# Patient Record
Sex: FEMALE | Race: WHITE | Employment: UNEMPLOYED
[De-identification: names, ages, dates, MRNs, and addresses within clinical notes are randomized per-mention and may not be internally consistent; named-entity substitution may affect disease eponyms.]

---

## 2019-11-29 ENCOUNTER — NURSE TRIAGE (OUTPATIENT)
Dept: OTHER | Facility: CLINIC | Age: 24
End: 2019-11-29

## 2019-12-03 ENCOUNTER — NURSE ONLY (OUTPATIENT)
Dept: LAB | Age: 24
End: 2019-12-03

## 2020-06-19 ENCOUNTER — ANESTHESIA EVENT (OUTPATIENT)
Dept: LABOR AND DELIVERY | Age: 25
End: 2020-06-19
Payer: COMMERCIAL

## 2020-06-19 ENCOUNTER — HOSPITAL ENCOUNTER (INPATIENT)
Age: 25
LOS: 2 days | Discharge: HOME | End: 2020-06-21
Attending: OBSTETRICS & GYNECOLOGY | Admitting: OBSTETRICS & GYNECOLOGY
Payer: COMMERCIAL

## 2020-06-19 ENCOUNTER — ANESTHESIA (OUTPATIENT)
Dept: LABOR AND DELIVERY | Age: 25
End: 2020-06-19
Payer: COMMERCIAL

## 2020-06-19 LAB
ABO: NORMAL
AMPHETAMINE+METHAMPHETAMINE URINE SCREEN: NEGATIVE
ANTIBODY SCREEN: NORMAL
BARBITURATE QUANTITATIVE URINE: NEGATIVE
BASOPHILS # BLD: 0.4 %
BASOPHILS ABSOLUTE: 0.1 THOU/MM3 (ref 0–0.1)
BENZODIAZEPINE QUANTITATIVE URINE: NEGATIVE
CANNABINOID QUANTITATIVE URINE: NEGATIVE
COCAINE METABOLITE QUANTITATIVE URINE: NEGATIVE
EOSINOPHIL # BLD: 0.8 %
EOSINOPHILS ABSOLUTE: 0.1 THOU/MM3 (ref 0–0.4)
ERYTHROCYTE [DISTWIDTH] IN BLOOD BY AUTOMATED COUNT: 13.6 % (ref 11.5–14.5)
ERYTHROCYTE [DISTWIDTH] IN BLOOD BY AUTOMATED COUNT: 42.8 FL (ref 35–45)
HCT VFR BLD CALC: 36.9 % (ref 37–47)
HEMOGLOBIN: 11.9 GM/DL (ref 12–16)
IMMATURE GRANS (ABS): 0.14 THOU/MM3 (ref 0–0.07)
IMMATURE GRANULOCYTES: 0.9 %
LYMPHOCYTES # BLD: 11.5 %
LYMPHOCYTES ABSOLUTE: 1.7 THOU/MM3 (ref 1–4.8)
MCH RBC QN AUTO: 28.4 PG (ref 26–33)
MCHC RBC AUTO-ENTMCNC: 32.2 GM/DL (ref 32.2–35.5)
MCV RBC AUTO: 88.1 FL (ref 81–99)
MONOCYTES # BLD: 7 %
MONOCYTES ABSOLUTE: 1.1 THOU/MM3 (ref 0.4–1.3)
NUCLEATED RED BLOOD CELLS: 0 /100 WBC
OPIATES, URINE: NEGATIVE
OXYCODONE: NEGATIVE
PHENCYCLIDINE QUANTITATIVE URINE: NEGATIVE
PLATELET # BLD: 137 THOU/MM3 (ref 130–400)
PMV BLD AUTO: 12.2 FL (ref 9.4–12.4)
RBC # BLD: 4.19 MILL/MM3 (ref 4.2–5.4)
RH FACTOR: NORMAL
RPR: NONREACTIVE
SEG NEUTROPHILS: 79.4 %
SEGMENTED NEUTROPHILS ABSOLUTE COUNT: 12.1 THOU/MM3 (ref 1.8–7.7)
WBC # BLD: 15.2 THOU/MM3 (ref 4.8–10.8)

## 2020-06-19 PROCEDURE — 3700000025 EPIDURAL BLOCK: Performed by: ANESTHESIOLOGY

## 2020-06-19 PROCEDURE — 85025 COMPLETE CBC W/AUTO DIFF WBC: CPT

## 2020-06-19 PROCEDURE — 86901 BLOOD TYPING SEROLOGIC RH(D): CPT

## 2020-06-19 PROCEDURE — 10907ZC DRAINAGE OF AMNIOTIC FLUID, THERAPEUTIC FROM PRODUCTS OF CONCEPTION, VIA NATURAL OR ARTIFICIAL OPENING: ICD-10-PCS | Performed by: OBSTETRICS & GYNECOLOGY

## 2020-06-19 PROCEDURE — 36415 COLL VENOUS BLD VENIPUNCTURE: CPT

## 2020-06-19 PROCEDURE — 7200000001 HC VAGINAL DELIVERY

## 2020-06-19 PROCEDURE — 80307 DRUG TEST PRSMV CHEM ANLYZR: CPT

## 2020-06-19 PROCEDURE — 2500000003 HC RX 250 WO HCPCS: Performed by: ANESTHESIOLOGY

## 2020-06-19 PROCEDURE — 1220000001 HC SEMI PRIVATE L&D R&B

## 2020-06-19 PROCEDURE — 2709999900 HC NON-CHARGEABLE SUPPLY

## 2020-06-19 PROCEDURE — 86900 BLOOD TYPING SEROLOGIC ABO: CPT

## 2020-06-19 PROCEDURE — 6360000002 HC RX W HCPCS

## 2020-06-19 PROCEDURE — 0KQM0ZZ REPAIR PERINEUM MUSCLE, OPEN APPROACH: ICD-10-PCS | Performed by: OBSTETRICS & GYNECOLOGY

## 2020-06-19 PROCEDURE — 6370000000 HC RX 637 (ALT 250 FOR IP): Performed by: OBSTETRICS & GYNECOLOGY

## 2020-06-19 PROCEDURE — 86850 RBC ANTIBODY SCREEN: CPT

## 2020-06-19 PROCEDURE — 2580000003 HC RX 258: Performed by: OBSTETRICS & GYNECOLOGY

## 2020-06-19 PROCEDURE — 6360000002 HC RX W HCPCS: Performed by: OBSTETRICS & GYNECOLOGY

## 2020-06-19 PROCEDURE — 86592 SYPHILIS TEST NON-TREP QUAL: CPT

## 2020-06-19 RX ORDER — MORPHINE SULFATE 4 MG/ML
4 INJECTION, SOLUTION INTRAMUSCULAR; INTRAVENOUS
Status: DISCONTINUED | OUTPATIENT
Start: 2020-06-19 | End: 2020-06-20 | Stop reason: HOSPADM

## 2020-06-19 RX ORDER — EPHEDRINE SULFATE/0.9% NACL/PF 50 MG/5 ML
5 SYRINGE (ML) INTRAVENOUS PRN
Status: DISCONTINUED | OUTPATIENT
Start: 2020-06-19 | End: 2020-06-20

## 2020-06-19 RX ORDER — BUTORPHANOL TARTRATE 1 MG/ML
INJECTION, SOLUTION INTRAMUSCULAR; INTRAVENOUS
Status: COMPLETED
Start: 2020-06-19 | End: 2020-06-19

## 2020-06-19 RX ORDER — BUTORPHANOL TARTRATE 1 MG/ML
1 INJECTION, SOLUTION INTRAMUSCULAR; INTRAVENOUS
Status: DISCONTINUED | OUTPATIENT
Start: 2020-06-19 | End: 2020-06-20 | Stop reason: HOSPADM

## 2020-06-19 RX ORDER — METHYLERGONOVINE MALEATE 0.2 MG/ML
200 INJECTION INTRAVENOUS PRN
Status: DISCONTINUED | OUTPATIENT
Start: 2020-06-19 | End: 2020-06-20

## 2020-06-19 RX ORDER — MISOPROSTOL 200 UG/1
1000 TABLET ORAL PRN
Status: DISCONTINUED | OUTPATIENT
Start: 2020-06-19 | End: 2020-06-20 | Stop reason: HOSPADM

## 2020-06-19 RX ORDER — CARBOPROST TROMETHAMINE 250 UG/ML
250 INJECTION, SOLUTION INTRAMUSCULAR PRN
Status: DISCONTINUED | OUTPATIENT
Start: 2020-06-19 | End: 2020-06-20

## 2020-06-19 RX ORDER — SODIUM CHLORIDE 0.9 % (FLUSH) 0.9 %
10 SYRINGE (ML) INJECTION PRN
Status: DISCONTINUED | OUTPATIENT
Start: 2020-06-19 | End: 2020-06-20 | Stop reason: HOSPADM

## 2020-06-19 RX ORDER — MORPHINE SULFATE 2 MG/ML
2 INJECTION, SOLUTION INTRAMUSCULAR; INTRAVENOUS
Status: DISCONTINUED | OUTPATIENT
Start: 2020-06-19 | End: 2020-06-20 | Stop reason: HOSPADM

## 2020-06-19 RX ORDER — ONDANSETRON 2 MG/ML
8 INJECTION INTRAMUSCULAR; INTRAVENOUS EVERY 6 HOURS PRN
Status: DISCONTINUED | OUTPATIENT
Start: 2020-06-19 | End: 2020-06-20 | Stop reason: HOSPADM

## 2020-06-19 RX ORDER — DIPHENHYDRAMINE HYDROCHLORIDE 50 MG/ML
25 INJECTION INTRAMUSCULAR; INTRAVENOUS EVERY 4 HOURS PRN
Status: DISCONTINUED | OUTPATIENT
Start: 2020-06-19 | End: 2020-06-20

## 2020-06-19 RX ORDER — LIDOCAINE HYDROCHLORIDE 10 MG/ML
30 INJECTION, SOLUTION EPIDURAL; INFILTRATION; INTRACAUDAL; PERINEURAL PRN
Status: DISCONTINUED | OUTPATIENT
Start: 2020-06-19 | End: 2020-06-20 | Stop reason: HOSPADM

## 2020-06-19 RX ORDER — TERBUTALINE SULFATE 1 MG/ML
0.25 INJECTION, SOLUTION SUBCUTANEOUS ONCE
Status: DISCONTINUED | OUTPATIENT
Start: 2020-06-19 | End: 2020-06-20 | Stop reason: HOSPADM

## 2020-06-19 RX ORDER — SODIUM CHLORIDE, SODIUM LACTATE, POTASSIUM CHLORIDE, CALCIUM CHLORIDE 600; 310; 30; 20 MG/100ML; MG/100ML; MG/100ML; MG/100ML
INJECTION, SOLUTION INTRAVENOUS CONTINUOUS
Status: DISCONTINUED | OUTPATIENT
Start: 2020-06-19 | End: 2020-06-20

## 2020-06-19 RX ORDER — ACETAMINOPHEN 325 MG/1
650 TABLET ORAL EVERY 4 HOURS PRN
Status: DISCONTINUED | OUTPATIENT
Start: 2020-06-19 | End: 2020-06-20 | Stop reason: HOSPADM

## 2020-06-19 RX ADMIN — ACETAMINOPHEN 650 MG: 325 TABLET ORAL at 23:33

## 2020-06-19 RX ADMIN — BUTORPHANOL TARTRATE 1 MG: 1 INJECTION, SOLUTION INTRAMUSCULAR; INTRAVENOUS at 09:39

## 2020-06-19 RX ADMIN — SODIUM CHLORIDE, POTASSIUM CHLORIDE, SODIUM LACTATE AND CALCIUM CHLORIDE: 600; 310; 30; 20 INJECTION, SOLUTION INTRAVENOUS at 13:03

## 2020-06-19 RX ADMIN — SODIUM CHLORIDE, POTASSIUM CHLORIDE, SODIUM LACTATE AND CALCIUM CHLORIDE: 600; 310; 30; 20 INJECTION, SOLUTION INTRAVENOUS at 17:28

## 2020-06-19 RX ADMIN — DIPHENHYDRAMINE HYDROCHLORIDE 25 MG: 50 INJECTION INTRAMUSCULAR; INTRAVENOUS at 15:03

## 2020-06-19 RX ADMIN — DIPHENHYDRAMINE HYDROCHLORIDE 25 MG: 50 INJECTION INTRAMUSCULAR; INTRAVENOUS at 19:53

## 2020-06-19 RX ADMIN — Medication 1 MILLI-UNITS/MIN: at 20:23

## 2020-06-19 RX ADMIN — SODIUM CHLORIDE, POTASSIUM CHLORIDE, SODIUM LACTATE AND CALCIUM CHLORIDE: 600; 310; 30; 20 INJECTION, SOLUTION INTRAVENOUS at 09:25

## 2020-06-19 RX ADMIN — Medication 18 ML/HR: at 12:40

## 2020-06-19 ASSESSMENT — PAIN DESCRIPTION - DESCRIPTORS
DESCRIPTORS: CRAMPING

## 2020-06-19 ASSESSMENT — PAIN SCALES - GENERAL
PAINLEVEL_OUTOF10: 8
PAINLEVEL_OUTOF10: 2

## 2020-06-19 NOTE — PLAN OF CARE
Problem: Pain:  Goal: Pain level will decrease  Description: Pain level will decrease  Outcome: Ongoing  Note: Pain level decreased with dose of stadol from and 8 down to a 3 on the pamela scale and the pain goal is 6. Goal: Control of acute pain  Description: Control of acute pain  Outcome: Ongoing  Note: The pain of contraction cramping has been reduced from and 8 down to a 3 on the pamela scale with IV stadol and pain goal is 6. Goal: Control of chronic pain  Description: Control of chronic pain  Outcome: Ongoing  Note: Patient does not have chronic pain so this is not applicable. Problem: Anxiety:  Goal: Level of anxiety will decrease  Description: Level of anxiety will decrease  Outcome: Ongoing  Note: Discussed anxiety and encouraged to ask questions and plan of care discussed and verbalizes understanding of of plan of care. Verbalizes to ask questions to decrease anxiety. Problem: Breathing Pattern - Ineffective:  Goal: Able to breathe comfortably  Description: Able to breathe comfortably  Outcome: Ongoing  Note: Breathing normal and oxygen saturation is normal.     Problem: Fluid Volume - Imbalance:  Goal: Absence of imbalanced fluid volume signs and symptoms  Description: Absence of imbalanced fluid volume signs and symptoms  Outcome: Ongoing  Note: Afebrile and membranes are intact and will continue to monitor temperature and odor of amniotic fluid when it breaks. Goal: Absence of intrapartum hemorrhage signs and symptoms  Description: Absence of intrapartum hemorrhage signs and symptoms  Outcome: Ongoing  Note: No vaginal bleeding and will continue to monitor during labor. Problem: Infection - Intrapartum Infection:  Goal: Will show no infection signs and symptoms  Description: Will show no infection signs and symptoms  Outcome: Ongoing  Note: Afebrile and membranes are intact.      Problem: Labor Process - Prolonged:  Goal: Labor progression, first stage, within specified

## 2020-06-19 NOTE — FLOWSHEET NOTE
Fetal monitor applied to soft abdomen by ELMER Martinez RN and the feal heart tones are 132. Spoke with patient and informed her of results.  Educated on neutropenia precautions.  CBC will be drawn with PET scan on 6/22.    Information sent to patient regarding neutropenia via mail.

## 2020-06-19 NOTE — FLOWSHEET NOTE
Asked RINKU Modi RN if can just have a  and informed call call and ask OB doctor if wishes me to and states no I don't want that.

## 2020-06-19 NOTE — FLOWSHEET NOTE
2 para 0 with due date of 2020 arrived by wheelchair to labor and delivery. Ambulated to room 5c05 after weighed in the recovery room and says contractions started at 0900. Denies water being broke.

## 2020-06-19 NOTE — FLOWSHEET NOTE
Fetal monitor plugged back in and to bed and urine drug screen and CBC, type and screen and RPR sent to lab.

## 2020-06-19 NOTE — ANESTHESIA PROCEDURE NOTES
Epidural Block    Patient location during procedure: OB  Reason for block: labor epidural  Staffing  Anesthesiologist: Nabil Bryant DO  Performed: anesthesiologist   Preanesthetic Checklist  Completed: patient identified, site marked, surgical consent, pre-op evaluation, timeout performed, IV checked, risks and benefits discussed, monitors and equipment checked, anesthesia consent given, oxygen available and patient being monitored  Epidural  Patient position: sitting  Prep: ChloraPrep and site prepped and draped  Patient monitoring: continuous pulse ox and frequent blood pressure checks  Approach: midline  Location: lumbar (1-5)  Injection technique: NEENA saline  Provider prep: sterile gloves and mask  Needle  Needle type: Tuohy   Needle gauge: 18 G  Needle length: 3.5 in  Needle insertion depth: 5 cm  Catheter type: side hole  Catheter at skin depth: 11 cm  Test dose: negative  Assessment  Hemodynamics: stable  Attempts: 1

## 2020-06-19 NOTE — FLOWSHEET NOTE
Dr. Marquis Pires told that the patient is in labor with 2 cm cervix and paper thin and bulging bag of water with contractions 1-2 minutes apart and reactive tracing. Orders taken to initiate labor room orders.

## 2020-06-20 PROCEDURE — 6360000002 HC RX W HCPCS: Performed by: OBSTETRICS & GYNECOLOGY

## 2020-06-20 PROCEDURE — 90715 TDAP VACCINE 7 YRS/> IM: CPT | Performed by: OBSTETRICS & GYNECOLOGY

## 2020-06-20 PROCEDURE — 90472 IMMUNIZATION ADMIN EACH ADD: CPT | Performed by: OBSTETRICS & GYNECOLOGY

## 2020-06-20 PROCEDURE — 1200000000 HC SEMI PRIVATE

## 2020-06-20 PROCEDURE — 90471 IMMUNIZATION ADMIN: CPT

## 2020-06-20 PROCEDURE — 6370000000 HC RX 637 (ALT 250 FOR IP): Performed by: OBSTETRICS & GYNECOLOGY

## 2020-06-20 PROCEDURE — 90707 MMR VACCINE SC: CPT

## 2020-06-20 PROCEDURE — 6360000002 HC RX W HCPCS

## 2020-06-20 PROCEDURE — 2709999900 HC NON-CHARGEABLE SUPPLY

## 2020-06-20 RX ORDER — MORPHINE SULFATE 4 MG/ML
4 INJECTION, SOLUTION INTRAMUSCULAR; INTRAVENOUS
Status: DISCONTINUED | OUTPATIENT
Start: 2020-06-20 | End: 2020-06-21 | Stop reason: HOSPADM

## 2020-06-20 RX ORDER — HYDROCODONE BITARTRATE AND ACETAMINOPHEN 5; 325 MG/1; MG/1
1 TABLET ORAL EVERY 4 HOURS PRN
Status: DISCONTINUED | OUTPATIENT
Start: 2020-06-20 | End: 2020-06-21 | Stop reason: HOSPADM

## 2020-06-20 RX ORDER — SODIUM CHLORIDE 0.9 % (FLUSH) 0.9 %
10 SYRINGE (ML) INJECTION PRN
Status: DISCONTINUED | OUTPATIENT
Start: 2020-06-20 | End: 2020-06-21 | Stop reason: HOSPADM

## 2020-06-20 RX ORDER — DOCUSATE SODIUM 100 MG/1
100 CAPSULE, LIQUID FILLED ORAL 2 TIMES DAILY PRN
Status: DISCONTINUED | OUTPATIENT
Start: 2020-06-20 | End: 2020-06-21 | Stop reason: HOSPADM

## 2020-06-20 RX ORDER — ONDANSETRON 4 MG/1
8 TABLET, FILM COATED ORAL EVERY 8 HOURS PRN
Status: DISCONTINUED | OUTPATIENT
Start: 2020-06-20 | End: 2020-06-21 | Stop reason: HOSPADM

## 2020-06-20 RX ORDER — HYDROCODONE BITARTRATE AND ACETAMINOPHEN 5; 325 MG/1; MG/1
2 TABLET ORAL EVERY 4 HOURS PRN
Status: DISCONTINUED | OUTPATIENT
Start: 2020-06-20 | End: 2020-06-21 | Stop reason: HOSPADM

## 2020-06-20 RX ORDER — ONDANSETRON 2 MG/ML
4 INJECTION INTRAMUSCULAR; INTRAVENOUS EVERY 6 HOURS PRN
Status: DISCONTINUED | OUTPATIENT
Start: 2020-06-20 | End: 2020-06-21 | Stop reason: HOSPADM

## 2020-06-20 RX ORDER — MISOPROSTOL 200 UG/1
800 TABLET ORAL
Status: ACTIVE | OUTPATIENT
Start: 2020-06-20 | End: 2020-06-20

## 2020-06-20 RX ORDER — FERROUS SULFATE 325(65) MG
325 TABLET ORAL
Status: DISCONTINUED | OUTPATIENT
Start: 2020-06-20 | End: 2020-06-21 | Stop reason: HOSPADM

## 2020-06-20 RX ORDER — MODIFIED LANOLIN
OINTMENT (GRAM) TOPICAL PRN
Status: DISCONTINUED | OUTPATIENT
Start: 2020-06-20 | End: 2020-06-21 | Stop reason: HOSPADM

## 2020-06-20 RX ORDER — SODIUM CHLORIDE, SODIUM LACTATE, POTASSIUM CHLORIDE, CALCIUM CHLORIDE 600; 310; 30; 20 MG/100ML; MG/100ML; MG/100ML; MG/100ML
INJECTION, SOLUTION INTRAVENOUS CONTINUOUS
Status: DISCONTINUED | OUTPATIENT
Start: 2020-06-20 | End: 2020-06-21 | Stop reason: HOSPADM

## 2020-06-20 RX ORDER — CARBOPROST TROMETHAMINE 250 UG/ML
250 INJECTION, SOLUTION INTRAMUSCULAR
Status: DISPENSED | OUTPATIENT
Start: 2020-06-20 | End: 2020-06-20

## 2020-06-20 RX ORDER — METHYLERGONOVINE MALEATE 0.2 MG/ML
200 INJECTION INTRAVENOUS
Status: ACTIVE | OUTPATIENT
Start: 2020-06-20 | End: 2020-06-20

## 2020-06-20 RX ORDER — IBUPROFEN 800 MG/1
800 TABLET ORAL 3 TIMES DAILY
Status: DISCONTINUED | OUTPATIENT
Start: 2020-06-20 | End: 2020-06-21 | Stop reason: HOSPADM

## 2020-06-20 RX ORDER — SODIUM CHLORIDE 0.9 % (FLUSH) 0.9 %
10 SYRINGE (ML) INJECTION EVERY 12 HOURS SCHEDULED
Status: DISCONTINUED | OUTPATIENT
Start: 2020-06-20 | End: 2020-06-21 | Stop reason: HOSPADM

## 2020-06-20 RX ORDER — MORPHINE SULFATE 2 MG/ML
2 INJECTION, SOLUTION INTRAMUSCULAR; INTRAVENOUS
Status: DISCONTINUED | OUTPATIENT
Start: 2020-06-20 | End: 2020-06-21 | Stop reason: HOSPADM

## 2020-06-20 RX ORDER — ACETAMINOPHEN 325 MG/1
650 TABLET ORAL EVERY 4 HOURS PRN
Status: DISCONTINUED | OUTPATIENT
Start: 2020-06-20 | End: 2020-06-21 | Stop reason: HOSPADM

## 2020-06-20 RX ADMIN — TETANUS TOXOID, REDUCED DIPHTHERIA TOXOID AND ACELLULAR PERTUSSIS VACCINE, ADSORBED 0.5 ML: 5; 2.5; 8; 8; 2.5 SUSPENSION INTRAMUSCULAR at 22:50

## 2020-06-20 RX ADMIN — ACETAMINOPHEN 650 MG: 325 TABLET ORAL at 20:31

## 2020-06-20 RX ADMIN — IBUPROFEN 800 MG: 800 TABLET, FILM COATED ORAL at 06:45

## 2020-06-20 RX ADMIN — ACETAMINOPHEN 650 MG: 325 TABLET ORAL at 06:45

## 2020-06-20 RX ADMIN — IBUPROFEN 800 MG: 800 TABLET, FILM COATED ORAL at 17:14

## 2020-06-20 RX ADMIN — VORTIOXETINE 5 MG: 5 TABLET, FILM COATED ORAL at 22:48

## 2020-06-20 RX ADMIN — ACETAMINOPHEN 650 MG: 325 TABLET ORAL at 11:03

## 2020-06-20 RX ADMIN — MEASLES, MUMPS, AND RUBELLA VIRUS VACCINE LIVE 0.5 ML: 1000; 12500; 1000 INJECTION, POWDER, LYOPHILIZED, FOR SUSPENSION SUBCUTANEOUS at 22:49

## 2020-06-20 ASSESSMENT — PAIN SCALES - GENERAL
PAINLEVEL_OUTOF10: 3
PAINLEVEL_OUTOF10: 2
PAINLEVEL_OUTOF10: 5
PAINLEVEL_OUTOF10: 5
PAINLEVEL_OUTOF10: 4
PAINLEVEL_OUTOF10: 3
PAINLEVEL_OUTOF10: 0
PAINLEVEL_OUTOF10: 0

## 2020-06-20 NOTE — PROGRESS NOTES
Department of Obstetrics and Gynecology  Progress Note      S: doing well. No complaints. Lochia appropriate. Denies cp, sob, ct. Desires circumcision for male infant. O:   Vitals:    20 0831   BP: (!) 109/59   Pulse: 82   Resp: 16   Temp: 98.3 °F (36.8 °C)   SpO2:        Gen: no acute distress   Resp: breathing unlabored   Abd: soft, nondistended, fundus firm below umbilicus    A: 23 y.o.   PPD#1 s/p , doing well. P:   1. O POS   2. Con't postpartum care-to call [facility] today to change antidepressant to something that is compatible with breast feeding.   3. Anticipate d/c home tomorrow    Patricia Livingston  8:34 AM  2020

## 2020-06-20 NOTE — FLOWSHEET NOTE
Upon given bedside shift report to receiving staff and discussing pt's fall pt states \"that's happened before to me\"  Upon further discussion pt states that her right leg will just \"give out\" sometimes, but it is random. Educated pt on that pt is considered a high fall risk because of her history of her right knee buckling. Also educated pt that she should not be holding her baby while up and walking in her room because if her knee would give out and she was holding her baby then her baby would also fall and could be hurt and we want what is best and safest for both her and the baby.

## 2020-06-20 NOTE — PROGRESS NOTES
Called for delivery. On arrival at bedside, pt complete and +4. Pushing adequately with contractions. FHTs: 130, mod maico, +accels, variable. Shelton: q2-3 min. Category II tracing. Overall reassuring. Anticipate .      Nkechi Alexander  10:12 PM  2020

## 2020-06-20 NOTE — PLAN OF CARE
Problem: Falls - Risk of:  Goal: Will remain free from falls  Description: Will remain free from falls  Outcome: Ongoing  Note: Pt free from falls at this time     Problem: Falls - Risk of:  Goal: Absence of physical injury  Description: Absence of physical injury  Outcome: Ongoing  Note: Pt remains free from physical injury at this time     Problem: Discharge Planning:  Goal: Discharged to appropriate level of care  Description: Discharged to appropriate level of care  Outcome: Ongoing  Note: Remains in hospital, discussed possible discharge needs. Problem: Constipation:  Goal: Bowel elimination is within specified parameters  Description: Bowel elimination is within specified parameters  Outcome: Ongoing  Note: Pt has active bowel sounds     Problem: Fluid Volume - Imbalance:  Goal: Absence of postpartum hemorrhage signs and symptoms  Description: Absence of postpartum hemorrhage signs and symptoms  Outcome: Ongoing  Note: No signs of postpartum hemorrhage at this time     Problem: Infection - Risk of, Puerperal Infection:  Goal: Will show no infection signs and symptoms  Description: Will show no infection signs and symptoms  Outcome: Ongoing  Note: No signs of infection at this time     Problem: Mood - Altered:  Goal: Mood stable  Description: Mood stable  Outcome: Ongoing  Note: Pt appropriate with infant family and this RN     Problem: Pain - Acute:  Goal: Pain level will decrease  Description: Pain level will decrease  Outcome: Ongoing  Note: Pain controlled with po meds. Discussed ice for perineal pain and/or incisional pain or the use of warm blanket/heating pad for uterine cramps. Pt states her pain goal 3/10 has been met. Care plan reviewed with patient and she contributes to goal setting and voices understanding of plan of care.

## 2020-06-20 NOTE — FLOWSHEET NOTE
Post-Fall Assessment  Date of Fall:   6/19/2020  Time of Fall:   2331   Yes No N/A Comment   Was Patient on Falling Star Program? [] [x] []    Was the Fall Witnessed? [x] [] []    Were Clothes a Factor? [] [x] []    Was Patient Wearing Corrective Footwear? [] [x] []    Other Environmental Factors Involved? [] [x] []      Description of Fall  Who found the patient: Pt fell as she was ambulating to the bathroom 2 hours post delivery  Where was the patient at the time of the fall:  Ambulating to bathroom with a standby assist  Brief description of fall: RN stood pt at side of bed and had her march in place to make sure she had her feet underneath of her. Pt took 2 steps to the bathroom and then her right leg buckled and pt went down to her rt knee and then continued to lower herself to the floor at that point. Pt then with assistance of RN that she got back to standing and was assisted back to the bed  Patient comments regarding fall:   \"Wow, that was weird\"    Medications potentially contributing to fall risk (such as Sedatives, Hypnotics, Antihypertensives, Narcotics, Psychotropics, Anticonvulsants):   Pt is s/p epidural by 2 hours. Patient Assessment of Injury    (Please document Vital Signs in Doc Flowsheet)     Yes No   Patient hit his/her head [] [x]   Patient is taking an anticoagulant [] [x]   CT of Head requested [] [x]     Neurological Assessment Protocol: If the patient has hit his/her head during this fall,   a Neurological Assessment must be completed every 2 hours for 12 hours;   every 3 hours for 24 hours;   then every 4 hours for 24 hours. Document in Doc Flowsheets. Neurological Assessment Protocol initiated  No    If the patient did not hit his/her head during this fall, monitor vital signs every 8 hours. Notify the physician within 24 hours and document.       Physician Notification  Please document under Provider Notification group within the Assessment (Complex Assessment) template of

## 2020-06-21 VITALS
HEART RATE: 99 BPM | OXYGEN SATURATION: 96 % | BODY MASS INDEX: 28.71 KG/M2 | SYSTOLIC BLOOD PRESSURE: 100 MMHG | TEMPERATURE: 97.8 F | HEIGHT: 62 IN | RESPIRATION RATE: 16 BRPM | WEIGHT: 156 LBS | DIASTOLIC BLOOD PRESSURE: 68 MMHG

## 2020-06-21 PROCEDURE — 6370000000 HC RX 637 (ALT 250 FOR IP): Performed by: OBSTETRICS & GYNECOLOGY

## 2020-06-21 PROCEDURE — 2709999900 HC NON-CHARGEABLE SUPPLY

## 2020-06-21 RX ORDER — IBUPROFEN 600 MG/1
600 TABLET ORAL EVERY 6 HOURS PRN
Qty: 30 TABLET | Refills: 1 | COMMUNITY
Start: 2020-06-21

## 2020-06-21 RX ORDER — ACETAMINOPHEN 325 MG/1
650 TABLET ORAL EVERY 6 HOURS PRN
Qty: 120 TABLET | Refills: 3 | COMMUNITY
Start: 2020-06-21

## 2020-06-21 RX ADMIN — IBUPROFEN 800 MG: 800 TABLET, FILM COATED ORAL at 01:17

## 2020-06-21 RX ADMIN — DOCUSATE SODIUM 100 MG: 100 CAPSULE, LIQUID FILLED ORAL at 10:28

## 2020-06-21 RX ADMIN — IBUPROFEN 800 MG: 800 TABLET, FILM COATED ORAL at 10:28

## 2020-06-21 ASSESSMENT — PAIN SCALES - GENERAL
PAINLEVEL_OUTOF10: 3
PAINLEVEL_OUTOF10: 2

## 2020-06-21 NOTE — FLOWSHEET NOTE
Pt assisted with moderate assist to wheelchair with stand and pivot approach. Right leg continues to be weak s/p epidural, pt notes sensation and denies any kind of pain.

## 2020-06-21 NOTE — DISCHARGE SUMMARY
Obstetric Discharge Summary      Pt Name: Gil Meckel  MRN: [id number] Jermaine #: [de-identified]  YOB: 1995        Admitting Diagnosis  23 y.o.  @ 38+2 wks IUP presented with labor. Reasons for Admission on 2020  9:06 AM  Vaginal Delivery    Hospital course: 23 y.o.  @ 38+2 wks IUP presented with labor. She progressed well and delivered via . Her post partum course was uncomplicated. She was discharged home on PPD#2 in good condition. Postpartum/Operative Complications  none    Discharge Diagnosis  male infant      Discharge Information  Current Discharge Medication List      START taking these medications    Details   ibuprofen (ADVIL;MOTRIN) 600 MG tablet Take 1 tablet by mouth every 6 hours as needed for Pain  Qty: 30 tablet, Refills: 1      acetaminophen (AMINOFEN) 325 MG tablet Take 2 tablets by mouth every 6 hours as needed for Pain  Qty: 120 tablet, Refills: 3         CONTINUE these medications which have NOT CHANGED    Details   Prenatal MV & Min w/FA-DHA ([facility] PRENATAL GUMMY PO) Take 2 tablets by mouth daily      VORTIoxetine (TRINTELLIX) 5 MG tablet Take 10 mg by mouth daily               Diet: regular  Activity: nothing in the vagina for 6 weeks-no sex, no tampons, no douching, no heavy lifting, limited activity for 2-3 weeks  Discharge to:  home  Follow up in 6 wks.     Rachelle Thompson  7:19 AM  2020

## 2020-06-21 NOTE — PLAN OF CARE
Care plan reviewed with patient   Patient   Problem: Falls - Risk of:  Goal: Will remain free from falls  Description: Will remain free from falls  6/21/2020 0905 by Pedro Luis Mane RN  Outcome: Ongoing     Problem: Falls - Risk of:  Goal: Absence of physical injury  Description: Absence of physical injury  6/21/2020 0905 by Pedro Luis Mane RN  Outcome: Ongoing     Problem: Discharge Planning:  Goal: Discharged to appropriate level of care  Description: Discharged to appropriate level of care  6/21/2020 0905 by Pedro Luis Mane RN  Outcome: Ongoing  Note: Pt voices understanding of discharge instructions, including when to call Dr. Shyann Jauregui: Constipation:  Goal: Bowel elimination is within specified parameters  Description: Bowel elimination is within specified parameters  6/21/2020 0905 by Pedro Luis Mane RN  Outcome: Ongoing  Note: Pt taking stool softeners as prescribed,  Drinking plenty of fluids,  Increasing fiber. Problem: Constipation:  Goal: Bowel elimination is within specified parameters  Description: Bowel elimination is within specified parameters  6/21/2020 0905 by Pedro Luis Mane RN  Outcome: Ongoing  Note: Pt taking stool softeners as prescribed,  Drinking plenty of fluids,  Increasing fiber. Problem: Fluid Volume - Imbalance:  Goal: Absence of postpartum hemorrhage signs and symptoms  Description: Absence of postpartum hemorrhage signs and symptoms  6/21/2020 0905 by Pedro Luis Mane RN  Outcome: Ongoing  Note: Pt. Lochia within normal limits. Problem: Fluid Volume - Imbalance:  Goal: Absence of postpartum hemorrhage signs and symptoms  Description: Absence of postpartum hemorrhage signs and symptoms  6/21/2020 0905 by Pedro Luis Mane RN  Outcome: Ongoing  Note: Pt. Lochia within normal limits.       Problem: Infection - Risk of, Puerperal Infection:  Goal: Will show no infection signs and symptoms  Description: Will show no infection signs and symptoms  6/21/2020 0905 by Pedro Luis Mane RN  Outcome: Ongoing  Note: Pt is afebrile,  Vitals within normal limits,  Shows no signs or symptoms of infection      Problem: Mood - Altered:  Goal: Mood stable  Description: Mood stable  6/21/2020 0905 by Sujey Reyes RN  Outcome: Ongoing  Note: Pt calm and cooperative      Problem: Pain - Acute:  Goal: Pain level will decrease  Description: Pain level will decrease  6/21/2020 0905 by Sujey Reyes RN  Outcome: Ongoing  Note: Pt states she is comfortable  Taking pain medications as ordered     verbalize understanding of the plan of care and contribute to goal setting.

## 2021-05-12 ENCOUNTER — NURSE TRIAGE (OUTPATIENT)
Dept: OTHER | Facility: CLINIC | Age: 26
End: 2021-05-12

## 2021-05-12 NOTE — TELEPHONE ENCOUNTER
Reason for Disposition   Syphilis, questions about   Symptoms of a Sexually Transmitted Disease or Infection (STD, STI)   Vaginal discharge   Patient is worried about sexually transmitted disease (STD)    Answer Assessment - Initial Assessment Questions  1. SYMPTOMS: \"Do you have any symptoms of an STD? \" If so, ask: \"What are they? \"      Blister type sores that come and go. She feels like it was contracted over 5 years ago. She states negative for Herpes and Syphilis in 2019.    2. TYPE: \"What STD do you have questions about? \"      Syphilis    3. EXPOSURE: \"Were you exposed to an STD? \" If so, ask: \"When and what kind of exposure? \"      Syphlis    4. PREVENTION: \"Do you have questions about preventing AIDS and other STDs? \"      No, just if she has it. 5. PREGNANCY: \"Is there any chance you are pregnant? \" \"When was your last menstrual period? \"      \"Possibly but I have an IUD. \"    Protocols used: STD QUESTIONS-ADULT-, [facility], VAGINAL DISCHARGE-ADULT-    Gynecologist - Dr. Maryann Chong. Bruising on legs - random. States she is not abused. They just show up. Blisters every now and then, vaginal area. Sometimes \"has what she is not used to. .. white discharge that  becomes yellowish. \"    Getting mixed messages. .. delayed answers. States she is now having low grade fevers. 99's. Recommendation is to be contact her OBGYN in the morning to discuss risks of STD and testing if necessary. This triage is a result of a call to Alycia ardon Nurse. Please do not respond to the triage nurse through this encounter. Any subsequent communication should be directly with the patient.

## 2023-02-06 ENCOUNTER — NURSE ONLY (OUTPATIENT)
Dept: LAB | Age: 28
End: 2023-02-06

## 2023-02-08 LAB
BACTERIA UR CULT: ABNORMAL
BACTERIA UR CULT: ABNORMAL
ORGANISM: ABNORMAL

## 2023-02-21 LAB — CYTOLOGY THIN PREP PAP: NORMAL

## 2023-03-21 ENCOUNTER — NURSE TRIAGE (OUTPATIENT)
Dept: OTHER | Facility: CLINIC | Age: 28
End: 2023-03-21
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
## 2023-04-06 ENCOUNTER — HOSPITAL ENCOUNTER (EMERGENCY)
Age: 28
Discharge: HOME | End: 2023-04-06
Payer: COMMERCIAL

## 2023-04-06 ENCOUNTER — APPOINTMENT (OUTPATIENT)
Dept: GENERAL RADIOLOGY | Age: 28
End: 2023-04-06
Payer: COMMERCIAL

## 2023-04-06 VITALS
SYSTOLIC BLOOD PRESSURE: 105 MMHG | WEIGHT: 150 LBS | TEMPERATURE: 98.7 F | RESPIRATION RATE: 18 BRPM | HEIGHT: 62 IN | BODY MASS INDEX: 27.6 KG/M2 | DIASTOLIC BLOOD PRESSURE: 71 MMHG | OXYGEN SATURATION: 98 % | HEART RATE: 81 BPM

## 2023-04-06 DIAGNOSIS — M25.511 ACUTE PAIN OF RIGHT SHOULDER: Primary | ICD-10-CM

## 2023-04-06 PROCEDURE — 96372 THER/PROPH/DIAG INJ SC/IM: CPT

## 2023-04-06 PROCEDURE — 6360000002 HC RX W HCPCS: Performed by: NURSE PRACTITIONER

## 2023-04-06 PROCEDURE — 96374 THER/PROPH/DIAG INJ IV PUSH: CPT

## 2023-04-06 PROCEDURE — 73030 X-RAY EXAM OF SHOULDER: CPT

## 2023-04-06 PROCEDURE — 99284 EMERGENCY DEPT VISIT MOD MDM: CPT

## 2023-04-06 RX ORDER — KETOROLAC TROMETHAMINE 30 MG/ML
30 INJECTION, SOLUTION INTRAMUSCULAR; INTRAVENOUS ONCE
Status: COMPLETED | OUTPATIENT
Start: 2023-04-06 | End: 2023-04-06

## 2023-04-06 RX ORDER — METHYLPREDNISOLONE SODIUM SUCCINATE 125 MG/2ML
60 INJECTION, POWDER, LYOPHILIZED, FOR SOLUTION INTRAMUSCULAR; INTRAVENOUS ONCE
Status: COMPLETED | OUTPATIENT
Start: 2023-04-06 | End: 2023-04-06

## 2023-04-06 RX ORDER — METHYLPREDNISOLONE 4 MG/1
TABLET ORAL
Qty: 1 KIT | Refills: 0 | Status: SHIPPED | OUTPATIENT
Start: 2023-04-06 | End: 2023-04-12

## 2023-04-06 RX ORDER — ORPHENADRINE CITRATE 30 MG/ML
60 INJECTION INTRAMUSCULAR; INTRAVENOUS ONCE
Status: COMPLETED | OUTPATIENT
Start: 2023-04-06 | End: 2023-04-06

## 2023-04-06 RX ORDER — IBUPROFEN 600 MG/1
600 TABLET ORAL 3 TIMES DAILY PRN
Qty: 30 TABLET | Refills: 0 | Status: SHIPPED | OUTPATIENT
Start: 2023-04-06

## 2023-04-06 RX ADMIN — ORPHENADRINE CITRATE 60 MG: 30 INJECTION INTRAMUSCULAR; INTRAVENOUS at 14:36

## 2023-04-06 RX ADMIN — METHYLPREDNISOLONE SODIUM SUCCINATE 60 MG: 125 INJECTION, POWDER, FOR SOLUTION INTRAMUSCULAR; INTRAVENOUS at 14:34

## 2023-04-06 RX ADMIN — KETOROLAC TROMETHAMINE 30 MG: 30 INJECTION, SOLUTION INTRAMUSCULAR at 14:35

## 2023-04-06 ASSESSMENT — PAIN - FUNCTIONAL ASSESSMENT
PAIN_FUNCTIONAL_ASSESSMENT: 0-10
PAIN_FUNCTIONAL_ASSESSMENT: 0-10

## 2023-04-06 ASSESSMENT — PAIN DESCRIPTION - ORIENTATION: ORIENTATION: LEFT;RIGHT

## 2023-04-06 ASSESSMENT — PAIN SCALES - GENERAL
PAINLEVEL_OUTOF10: 6
PAINLEVEL_OUTOF10: 6

## 2023-04-06 ASSESSMENT — PAIN DESCRIPTION - PAIN TYPE: TYPE: ACUTE PAIN

## 2023-04-06 ASSESSMENT — PAIN DESCRIPTION - LOCATION: LOCATION: SHOULDER

## 2023-04-06 NOTE — Clinical Note
Matthew Avila was seen and treated in our emergency department on 4/6/2023. She may return to work on 04/07/2023. If you have any questions or concerns, please don't hesitate to call.       Ariel Wise, KENDALL - CNP
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
## 2023-04-06 NOTE — Clinical Note
Sunshine Hammer was seen and treated in our emergency department on 4/6/2023. She may return to work on 04/07/2023. If you have any questions or concerns, please don't hesitate to call.       Carlos Haywood, APRN - CNP
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
## 2023-04-06 NOTE — DISCHARGE INSTRUCTIONS
Rest, stay well-hydrated. Over-the-counter Tylenol and/or Motrin as needed for pain. Medrol Dosepak as prescribed. Robaxin as needed for muscle pain, observe for sedative precautions, no drinking, driving or operating heavy machinery. Follow-up with [facility] within the next week for reevaluation. If any worsening or concerns return to the ER immediately.

## 2023-06-22 ENCOUNTER — NURSE ONLY (OUTPATIENT)
Dept: LAB | Age: 28
End: 2023-06-22

## 2024-02-08 ENCOUNTER — NURSE ONLY (OUTPATIENT)
Dept: LAB | Age: 29
End: 2024-02-08

## 2024-02-09 LAB — SOURCE: NORMAL

## 2024-02-11 LAB
SOURCE: NORMAL
TRICHOMONAS VAGINALI, MOLECULAR: NEGATIVE

## 2024-02-12 LAB
C. TRACHOMATIS DNA,THIN PREP: NEGATIVE
N. GONORRHOEAE DNA, THIN PREP: NEGATIVE
SOURCE: NORMAL

## 2024-02-18 LAB — CYTOLOGY THIN PREP PAP: NORMAL

## 2024-04-27 ENCOUNTER — HOSPITAL ENCOUNTER (EMERGENCY)
Age: 29
Discharge: HOME | End: 2024-04-28
Attending: EMERGENCY MEDICINE
Payer: MEDICAID

## 2024-04-27 ENCOUNTER — APPOINTMENT (OUTPATIENT)
Dept: GENERAL RADIOLOGY | Age: 29
End: 2024-04-27
Payer: MEDICAID

## 2024-04-27 VITALS
TEMPERATURE: 97.4 F | HEART RATE: 69 BPM | HEIGHT: 62 IN | OXYGEN SATURATION: 98 % | DIASTOLIC BLOOD PRESSURE: 71 MMHG | WEIGHT: 160 LBS | RESPIRATION RATE: 21 BRPM | SYSTOLIC BLOOD PRESSURE: 105 MMHG | BODY MASS INDEX: 29.44 KG/M2

## 2024-04-27 DIAGNOSIS — R07.9 CHEST PAIN, UNSPECIFIED TYPE: Primary | ICD-10-CM

## 2024-04-27 LAB
ANION GAP SERPL CALC-SCNC: 14 MEQ/L (ref 8–16)
BASOPHILS ABSOLUTE: 0.1 THOU/MM3 (ref 0–0.1)
BASOPHILS NFR BLD AUTO: 0.7 %
BUN SERPL-MCNC: 6 MG/DL (ref 7–22)
CALCIUM SERPL-MCNC: 9.2 MG/DL (ref 8.5–10.5)
CHLORIDE SERPL-SCNC: 102 MEQ/L (ref 98–111)
CO2 SERPL-SCNC: 21 MEQ/L (ref 23–33)
CREAT SERPL-MCNC: 0.8 MG/DL (ref 0.4–1.2)
D DIMER PPP IA.FEU-MCNC: 239 NG/ML FEU (ref 0–500)
DEPRECATED RDW RBC AUTO: 37.6 FL (ref 35–45)
EOSINOPHIL NFR BLD AUTO: 1.2 %
EOSINOPHILS ABSOLUTE: 0.1 THOU/MM3 (ref 0–0.4)
ERYTHROCYTE [DISTWIDTH] IN BLOOD BY AUTOMATED COUNT: 12.3 % (ref 11.5–14.5)
GFR SERPL CREATININE-BSD FRML MDRD: > 90 ML/MIN/1.73M2
GLUCOSE SERPL-MCNC: 97 MG/DL (ref 70–108)
HCG UR QL: NEGATIVE
HCT VFR BLD AUTO: 46.9 % (ref 37–47)
HGB BLD-MCNC: 15.9 GM/DL (ref 12–16)
IMM GRANULOCYTES # BLD AUTO: 0.04 THOU/MM3 (ref 0–0.07)
IMM GRANULOCYTES NFR BLD AUTO: 0.4 %
LYMPHOCYTES ABSOLUTE: 1.9 THOU/MM3 (ref 1–4.8)
LYMPHOCYTES NFR BLD AUTO: 17.1 %
MCH RBC QN AUTO: 29.2 PG (ref 26–33)
MCHC RBC AUTO-ENTMCNC: 33.9 GM/DL (ref 32.2–35.5)
MCV RBC AUTO: 86.2 FL (ref 81–99)
MONOCYTES ABSOLUTE: 0.5 THOU/MM3 (ref 0.4–1.3)
MONOCYTES NFR BLD AUTO: 4.5 %
NEUTROPHILS NFR BLD AUTO: 76.1 %
NRBC BLD AUTO-RTO: 0 /100 WBC
OSMOLALITY SERPL CALC.SUM OF ELEC: 271.4 MOSMOL/KG (ref 275–300)
PLATELET # BLD AUTO: 196 THOU/MM3 (ref 130–400)
PMV BLD AUTO: 11.7 FL (ref 9.4–12.4)
POTASSIUM SERPL-SCNC: 3.9 MEQ/L (ref 3.5–5.2)
RBC # BLD AUTO: 5.44 MILL/MM3 (ref 4.2–5.4)
SEGMENTED NEUTROPHILS ABSOLUTE COUNT: 8.4 THOU/MM3 (ref 1.8–7.7)
SODIUM SERPL-SCNC: 137 MEQ/L (ref 135–145)
WBC # BLD AUTO: 11 THOU/MM3 (ref 4.8–10.8)

## 2024-04-27 PROCEDURE — 71046 X-RAY EXAM CHEST 2 VIEWS: CPT

## 2024-04-27 PROCEDURE — 99285 EMERGENCY DEPT VISIT HI MDM: CPT

## 2024-04-27 PROCEDURE — 93005 ELECTROCARDIOGRAM TRACING: CPT

## 2024-04-27 PROCEDURE — 85379 FIBRIN DEGRADATION QUANT: CPT

## 2024-04-27 PROCEDURE — 80048 BASIC METABOLIC PNL TOTAL CA: CPT

## 2024-04-27 PROCEDURE — 85025 COMPLETE CBC W/AUTO DIFF WBC: CPT

## 2024-04-27 PROCEDURE — 81025 URINE PREGNANCY TEST: CPT

## 2024-04-27 PROCEDURE — 36415 COLL VENOUS BLD VENIPUNCTURE: CPT

## 2024-04-27 ASSESSMENT — PAIN SCALES - GENERAL: PAINLEVEL_OUTOF10: 7

## 2024-04-27 ASSESSMENT — PAIN - FUNCTIONAL ASSESSMENT: PAIN_FUNCTIONAL_ASSESSMENT: 0-10

## 2024-04-28 LAB
EKG ATRIAL RATE: 94 BPM
EKG P AXIS: 72 DEGREES
EKG P-R INTERVAL: 152 MS
EKG Q-T INTERVAL: 348 MS
EKG QRS DURATION: 102 MS
EKG QTC CALCULATION (BAZETT): 435 MS
EKG R AXIS: 48 DEGREES
EKG T AXIS: 46 DEGREES
EKG VENTRICULAR RATE: 94 BPM

## 2024-04-28 PROCEDURE — 93010 ELECTROCARDIOGRAM REPORT: CPT | Performed by: NUCLEAR MEDICINE

## 2024-04-28 NOTE — ED PROVIDER NOTES
demonstrated.      This document has been electronically signed by: Maury Bustamante MD on    04/27/2024 11:44 PM          LABS: (none if blank)  Labs Reviewed   CBC WITH AUTO DIFFERENTIAL - Abnormal; Notable for the following components:       Result Value    WBC 11.0 (*)     RBC 5.44 (*)     Segs Absolute 8.4 (*)     All other components within normal limits   BASIC METABOLIC PANEL - Abnormal; Notable for the following components:    CO2 21 (*)     BUN 6 (*)     All other components within normal limits   OSMOLALITY - Abnormal; Notable for the following components:    Osmolality Calc 271.4 (*)     All other components within normal limits   D-DIMER, QUANTITATIVE   PREGNANCY, URINE   ANION GAP   GLOMERULAR FILTRATION RATE, ESTIMATED       (Any cultures that may have been sent were not resulted at the time of this patient visit)    MEDICAL DECISION MAKING / ED COURSE:         Number and Complexity of Problems            Problem List This Visit:         Chief Complaint   Patient presents with    Chest Pain            Differential Diagnosis includes (but not limited to):                 ACS, pneumonia, COVID, flu, other viral URI, myocarditis, pericarditis, pneumothorax, aortic dissection, thoracic aortic aneurysm, GERD, cholecystitis, cholangitis, pancreatitis, peptic ulcer disease, skeletal muscular, costochondritis, pulmonary embolism          While some of the above are unlikely, they were still considered in my differential and medical decision making.                     Data Reviewed (none if left blank)         My Independent interpretations:   See ED Course for labs and image interpretations.   EKG interpreted by me:      Normal sinus rhythm without any ST elevations depressions or T wave inversions.  Ventricular rate 94 QTc 435                 External Documentation Reviewed:         Previous patient encounter documents & history available on EMR was reviewed            See Formal Diagnostic Results

## 2024-04-28 NOTE — ED TRIAGE NOTES
Pt to ED with c/o chest pain. Chest pain started three days ago with a pain of 3/10. Pt states the chest pain is currently 7/10. Pt states she has been having headaches, double vision, and dizziness. EKG completed. Telemetry in place. Pt's friend currently at bedside. Pt questions answered. Pt has even and unlabored respirations.

## 2024-04-28 NOTE — DISCHARGE INSTRUCTIONS
No significant abnormalities identified on your labs or imaging today.  Unclear what the cause of your pain is today and is important to follow-up with outpatient testing.  No primary care doctor was identified on your chart today so we did attach to [facility].  Please call them to establish as a patient for your outpatient testing needs.  Return to the emergency room any new or worsening symptoms.

## 2024-04-28 NOTE — ED NOTES
Pt sitting in bed with friend at bedside. Pt has even and unlabored respirations. Pt questions answered. Telemetry in place.

## 2024-10-04 ENCOUNTER — TELEPHONE (OUTPATIENT)
Dept: FAMILY MEDICINE CLINIC | Age: 29
End: 2024-10-04

## 2024-10-08 SDOH — HEALTH STABILITY: PHYSICAL HEALTH: ON AVERAGE, HOW MANY DAYS PER WEEK DO YOU ENGAGE IN MODERATE TO STRENUOUS EXERCISE (LIKE A BRISK WALK)?: 5 DAYS

## 2024-10-08 SDOH — HEALTH STABILITY: PHYSICAL HEALTH: ON AVERAGE, HOW MANY MINUTES DO YOU ENGAGE IN EXERCISE AT THIS LEVEL?: 50 MIN

## 2024-10-10 ENCOUNTER — OFFICE VISIT (OUTPATIENT)
Dept: FAMILY MEDICINE CLINIC | Age: 29
End: 2024-10-10

## 2024-10-10 ENCOUNTER — LAB (OUTPATIENT)
Dept: LAB | Age: 29
End: 2024-10-10

## 2024-10-10 VITALS
SYSTOLIC BLOOD PRESSURE: 106 MMHG | TEMPERATURE: 97.7 F | BODY MASS INDEX: 26.58 KG/M2 | WEIGHT: 150 LBS | OXYGEN SATURATION: 98 % | HEIGHT: 63 IN | HEART RATE: 96 BPM | DIASTOLIC BLOOD PRESSURE: 62 MMHG | RESPIRATION RATE: 16 BRPM

## 2024-10-10 DIAGNOSIS — F17.200 SMOKER: ICD-10-CM

## 2024-10-10 DIAGNOSIS — M26.629 CHRONIC TMJ PAIN: ICD-10-CM

## 2024-10-10 DIAGNOSIS — Z00.00 WELL ADULT EXAM: Primary | ICD-10-CM

## 2024-10-10 DIAGNOSIS — Q79.60 EHLERS-DANLOS SYNDROME: ICD-10-CM

## 2024-10-10 DIAGNOSIS — Z23 FLU VACCINE NEED: ICD-10-CM

## 2024-10-10 DIAGNOSIS — G89.29 CHRONIC TMJ PAIN: ICD-10-CM

## 2024-10-10 DIAGNOSIS — B37.0 ORAL THRUSH: ICD-10-CM

## 2024-10-10 DIAGNOSIS — G47.9 DISORDERED SLEEP: ICD-10-CM

## 2024-10-10 RX ORDER — ATOMOXETINE 60 MG/1
60 CAPSULE ORAL DAILY
COMMUNITY

## 2024-10-10 RX ORDER — FLUCONAZOLE 150 MG/1
150 TABLET ORAL EVERY OTHER DAY
Qty: 3 TABLET | Refills: 0 | Status: SHIPPED | OUTPATIENT
Start: 2024-10-10 | End: 2024-10-15

## 2024-10-10 RX ORDER — NYSTATIN 100000 [USP'U]/ML
500000 SUSPENSION ORAL 4 TIMES DAILY
Qty: 200 ML | Refills: 0 | Status: SHIPPED | OUTPATIENT
Start: 2024-10-10 | End: 2024-10-20

## 2024-10-10 SDOH — ECONOMIC STABILITY: FOOD INSECURITY: WITHIN THE PAST 12 MONTHS, THE FOOD YOU BOUGHT JUST DIDN'T LAST AND YOU DIDN'T HAVE MONEY TO GET MORE.: NEVER TRUE

## 2024-10-10 SDOH — ECONOMIC STABILITY: FOOD INSECURITY: WITHIN THE PAST 12 MONTHS, YOU WORRIED THAT YOUR FOOD WOULD RUN OUT BEFORE YOU GOT MONEY TO BUY MORE.: NEVER TRUE

## 2024-10-10 SDOH — ECONOMIC STABILITY: INCOME INSECURITY: HOW HARD IS IT FOR YOU TO PAY FOR THE VERY BASICS LIKE FOOD, HOUSING, MEDICAL CARE, AND HEATING?: NOT HARD AT ALL

## 2024-10-10 ASSESSMENT — PATIENT HEALTH QUESTIONNAIRE - PHQ9
SUM OF ALL RESPONSES TO PHQ QUESTIONS 1-9: 0
2. FEELING DOWN, DEPRESSED OR HOPELESS: NOT AT ALL
SUM OF ALL RESPONSES TO PHQ QUESTIONS 1-9: 0
SUM OF ALL RESPONSES TO PHQ QUESTIONS 1-9: 0
1. LITTLE INTEREST OR PLEASURE IN DOING THINGS: NOT AT ALL
SUM OF ALL RESPONSES TO PHQ9 QUESTIONS 1 & 2: 0
SUM OF ALL RESPONSES TO PHQ QUESTIONS 1-9: 0

## 2024-10-10 ASSESSMENT — ENCOUNTER SYMPTOMS
WHEEZING: 0
SHORTNESS OF BREATH: 0
EYE REDNESS: 0
EYE PAIN: 0
VOMITING: 0
ABDOMINAL PAIN: 0
NAUSEA: 0
DIARRHEA: 0
ALLERGIC/IMMUNOLOGIC NEGATIVE: 1
COUGH: 0
RHINORRHEA: 0
CONSTIPATION: 0
SORE THROAT: 0
TROUBLE SWALLOWING: 0
EYE DISCHARGE: 0
BACK PAIN: 0

## 2024-10-10 NOTE — PROGRESS NOTES
Well Adult Note  Name: Devora Davis Today’s Date: 10/10/2024   MRN: [id number] Sex: Female   Age: 29 y.o. Ethnicity: Non- / Non    : 1995 Race: White (non-)      Devora Davis is here for a well adult exam and be establish as new patient.       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  She has had lifelong issues with her knees, had surgery on both elbows due to fractures, has chronic TMJ laxity that she sees chiropractic care for.  She also has chronic constipation, her skin seems very loose and she bruises easily.  She has multiple joint pain that is chronic.    She complains of white tongue and oral thrush today.  She is a smoker.  States she also has frequent vaginitis from thrush and she is not currently sexually active.    Review of Systems   Constitutional:  Negative for activity change, fatigue and fever.   HENT:  Negative for congestion, ear pain, rhinorrhea, sore throat and trouble swallowing.         Thrush   Eyes:  Negative for pain, discharge and redness.   Respiratory:  Negative for cough, shortness of breath and wheezing.    Cardiovascular: Negative.    Gastrointestinal:  Negative for abdominal pain, constipation, diarrhea, nausea and vomiting.   Endocrine: Negative.    Genitourinary:  Negative for dysuria, frequency and urgency.   Musculoskeletal:  Positive for arthralgias. Negative for back pain and myalgias.   Skin:  Negative for rash.   Allergic/Immunologic: Negative.    Neurological:  Negative for dizziness, tremors, weakness and headaches.   Hematological: Negative.    Psychiatric/Behavioral:  Negative for dysphoric mood and sleep disturbance. The patient is not nervous/anxious.        Allergies   Allergen Reactions    Amoxicillin     Benadryl [Diphenhydramine] Headaches     Prior to Visit Medications    Medication Sig Taking? Authorizing Provider   atomoxetine (STRATTERA) 60 MG capsule Take 1 capsule by mouth daily Yes Provider,

## 2024-10-10 NOTE — PROGRESS NOTES
Immunizations Administered       Name Date Dose Route    Influenza, FLUCELVAX, (age 6 mo+) IM, Trivalent PF, 0.5mL 10/10/2024 0.5 mL Intramuscular    Site: Deltoid- Right    Lot: [id number]    NDC: 71127-305-37

## 2024-10-16 ENCOUNTER — LAB (OUTPATIENT)
Dept: LAB | Age: 29
End: 2024-10-16

## 2024-10-16 DIAGNOSIS — Z00.00 WELL ADULT EXAM: ICD-10-CM

## 2024-10-16 LAB
ALBUMIN SERPL BCG-MCNC: 4.1 G/DL (ref 3.5–5.1)
ALP SERPL-CCNC: 62 U/L (ref 38–126)
ALT SERPL W/O P-5'-P-CCNC: 13 U/L (ref 11–66)
ANION GAP SERPL CALC-SCNC: 12 MEQ/L (ref 8–16)
AST SERPL-CCNC: 20 U/L (ref 5–40)
BASOPHILS ABSOLUTE: 0.1 THOU/MM3 (ref 0–0.1)
BASOPHILS NFR BLD AUTO: 1.1 %
BILIRUB CONJ SERPL-MCNC: < 0.1 MG/DL (ref 0.1–13.8)
BILIRUB SERPL-MCNC: 0.3 MG/DL (ref 0.3–1.2)
BUN SERPL-MCNC: 6 MG/DL (ref 7–22)
CALCIUM SERPL-MCNC: 8.8 MG/DL (ref 8.5–10.5)
CHLORIDE SERPL-SCNC: 104 MEQ/L (ref 98–111)
CHOLESTEROL, FASTING: 107 MG/DL (ref 100–199)
CO2 SERPL-SCNC: 22 MEQ/L (ref 23–33)
CREAT SERPL-MCNC: 0.8 MG/DL (ref 0.4–1.2)
DEPRECATED RDW RBC AUTO: 38.6 FL (ref 35–45)
EOSINOPHIL NFR BLD AUTO: 5.3 %
EOSINOPHILS ABSOLUTE: 0.5 THOU/MM3 (ref 0–0.4)
ERYTHROCYTE [DISTWIDTH] IN BLOOD BY AUTOMATED COUNT: 12.3 % (ref 11.5–14.5)
GFR SERPL CREATININE-BSD FRML MDRD: > 90 ML/MIN/1.73M2
GLUCOSE SERPL-MCNC: 88 MG/DL (ref 70–108)
HCT VFR BLD AUTO: 41.9 % (ref 37–47)
HDLC SERPL-MCNC: 33 MG/DL
HGB BLD-MCNC: 13.7 GM/DL (ref 12–16)
IMM GRANULOCYTES # BLD AUTO: 0.02 THOU/MM3 (ref 0–0.07)
IMM GRANULOCYTES NFR BLD AUTO: 0.2 %
LDLC SERPL CALC-MCNC: 62 MG/DL
LYMPHOCYTES ABSOLUTE: 2.5 THOU/MM3 (ref 1–4.8)
LYMPHOCYTES NFR BLD AUTO: 26.6 %
MCH RBC QN AUTO: 28.4 PG (ref 26–33)
MCHC RBC AUTO-ENTMCNC: 32.7 GM/DL (ref 32.2–35.5)
MCV RBC AUTO: 86.9 FL (ref 81–99)
MONOCYTES ABSOLUTE: 0.6 THOU/MM3 (ref 0.4–1.3)
MONOCYTES NFR BLD AUTO: 6.2 %
NEUTROPHILS ABSOLUTE: 5.6 THOU/MM3 (ref 1.8–7.7)
NEUTROPHILS NFR BLD AUTO: 60.6 %
NRBC BLD AUTO-RTO: 0 /100 WBC
PLATELET # BLD AUTO: 170 THOU/MM3 (ref 130–400)
PMV BLD AUTO: 12.1 FL (ref 9.4–12.4)
POTASSIUM SERPL-SCNC: 4.4 MEQ/L (ref 3.5–5.2)
PROT SERPL-MCNC: 6.7 G/DL (ref 6.1–8)
RBC # BLD AUTO: 4.82 MILL/MM3 (ref 4.2–5.4)
SODIUM SERPL-SCNC: 138 MEQ/L (ref 135–145)
TRIGLYCERIDE, FASTING: 62 MG/DL (ref 0–199)
WBC # BLD AUTO: 9.3 THOU/MM3 (ref 4.8–10.8)

## 2024-11-01 ENCOUNTER — OFFICE VISIT (OUTPATIENT)
Dept: FAMILY MEDICINE CLINIC | Age: 29
End: 2024-11-01
Payer: MEDICAID

## 2024-11-01 VITALS
OXYGEN SATURATION: 100 % | TEMPERATURE: 98.4 F | SYSTOLIC BLOOD PRESSURE: 98 MMHG | WEIGHT: 153.6 LBS | DIASTOLIC BLOOD PRESSURE: 64 MMHG | HEART RATE: 74 BPM | BODY MASS INDEX: 27.65 KG/M2

## 2024-11-01 DIAGNOSIS — D72.10 EOSINOPHILIA, UNSPECIFIED TYPE: Primary | ICD-10-CM

## 2024-11-01 DIAGNOSIS — L70.0 CYSTIC ACNE: ICD-10-CM

## 2024-11-01 PROBLEM — F17.200 SMOKER: Status: RESOLVED | Noted: 2024-10-10 | Resolved: 2024-11-01

## 2024-11-01 PROCEDURE — 99213 OFFICE O/P EST LOW 20 MIN: CPT | Performed by: NURSE PRACTITIONER

## 2024-11-01 RX ORDER — CLINDAMYCIN HYDROCHLORIDE 300 MG/1
300 CAPSULE ORAL 3 TIMES DAILY
Qty: 21 CAPSULE | Refills: 0 | Status: SHIPPED | OUTPATIENT
Start: 2024-11-01 | End: 2024-11-08

## 2024-11-01 ASSESSMENT — ENCOUNTER SYMPTOMS
DIARRHEA: 0
NAUSEA: 0
ABDOMINAL PAIN: 0
WHEEZING: 0
CONSTIPATION: 0
ALLERGIC/IMMUNOLOGIC NEGATIVE: 1
ROS SKIN COMMENTS: ACNE
BACK PAIN: 0
VOMITING: 0
EYE REDNESS: 0
RHINORRHEA: 0
SORE THROAT: 0
TROUBLE SWALLOWING: 0
EYE PAIN: 0
EYE DISCHARGE: 0
SHORTNESS OF BREATH: 0
COUGH: 0

## 2024-11-01 NOTE — PROGRESS NOTES
[facility] PROFESSIONAL SERVS  [facility]  [address] [address]  Dept: [phone]  Dept Fax: [phone]  Loc: [phone]     Visit Date:  11/1/2024      Patient:  Devora Davis  YOB: 1995    HPI:     Chief Complaint   Patient presents with    Discuss Labs    Acne     Patient not being currently treated for acne would like to discuss this        Patient here about significant cystic acne on her back and somewhat on her face and neck.  This has been chronic but she is never sought out any kind of treatment for it.  States her father has a lifelong history of this.  Also wants to review her recent lab results.        Medications    Current Outpatient Medications:     clindamycin (CLEOCIN) 300 MG capsule, Take 1 capsule by mouth 3 times daily for 7 days, Disp: 21 capsule, Rfl: 0    atomoxetine (STRATTERA) 60 MG capsule, Take 1 capsule by mouth daily, Disp: , Rfl:     HYDROXYZINE HCL PO, Take 50 mg by mouth daily, Disp: , Rfl:     PARAGARD INTRAUTERINE COPPER IU, Paragard Intrauterine Copper, Disp: , Rfl:     ARIPiprazole (ABILIFY IM), Inject 600 mg into the muscle every 30 days, Disp: , Rfl:     ARISTADA 882 MG/3.2ML PRSY injection, , Disp: , Rfl:     COPPER PO, IUD (copper) Quantity: 0 Refills: 0 Ordered: 30-Jun-2022 Evelyne, Jess Generic Substitution Allowed, Disp: , Rfl:     The patient is allergic to amoxicillin and benadryl [diphenhydramine].    Past Medical History  Devora  has a past medical history of ADHD (attention deficit hyperactivity disorder) and Mental disorder.    Subjective:      Review of Systems   Constitutional:  Negative for activity change, fatigue and fever.   HENT:  Negative for congestion, ear pain, rhinorrhea, sore throat and trouble swallowing.    Eyes:  Negative for pain, discharge and redness.   Respiratory:  Negative for cough, shortness of breath and wheezing.    Cardiovascular: Negative.    Gastrointestinal:

## 2024-12-12 ENCOUNTER — TELEPHONE (OUTPATIENT)
Dept: FAMILY MEDICINE CLINIC | Age: 29
End: 2024-12-12

## 2024-12-12 DIAGNOSIS — L70.0 CYSTIC ACNE: Primary | ICD-10-CM

## 2024-12-12 RX ORDER — CLINDAMYCIN HYDROCHLORIDE 300 MG/1
300 CAPSULE ORAL 3 TIMES DAILY
Qty: 21 CAPSULE | Refills: 2 | Status: SHIPPED | OUTPATIENT
Start: 2024-12-12 | End: 2025-01-02

## 2024-12-30 ENCOUNTER — OFFICE VISIT (OUTPATIENT)
Dept: FAMILY MEDICINE CLINIC | Age: 29
End: 2024-12-30
Payer: MEDICAID

## 2024-12-30 VITALS
SYSTOLIC BLOOD PRESSURE: 108 MMHG | RESPIRATION RATE: 16 BRPM | WEIGHT: 150 LBS | HEART RATE: 78 BPM | BODY MASS INDEX: 27 KG/M2 | TEMPERATURE: 98.2 F | DIASTOLIC BLOOD PRESSURE: 60 MMHG

## 2024-12-30 DIAGNOSIS — F25.8 OTHER SCHIZOAFFECTIVE DISORDERS (HCC): ICD-10-CM

## 2024-12-30 DIAGNOSIS — R56.9 CONVULSIONS, UNSPECIFIED CONVULSION TYPE (HCC): ICD-10-CM

## 2024-12-30 DIAGNOSIS — R09.82 ALLERGIC RHINITIS WITH POSTNASAL DRIP: Primary | ICD-10-CM

## 2024-12-30 DIAGNOSIS — J31.2 CHRONIC SORE THROAT: ICD-10-CM

## 2024-12-30 DIAGNOSIS — J30.9 ALLERGIC RHINITIS WITH POSTNASAL DRIP: Primary | ICD-10-CM

## 2024-12-30 PROCEDURE — 99214 OFFICE O/P EST MOD 30 MIN: CPT | Performed by: NURSE PRACTITIONER

## 2024-12-30 RX ORDER — CETIRIZINE HYDROCHLORIDE 10 MG/1
10 TABLET ORAL DAILY
Qty: 60 TABLET | Refills: 2 | Status: SHIPPED | OUTPATIENT
Start: 2024-12-30 | End: 2025-06-28

## 2024-12-30 ASSESSMENT — ENCOUNTER SYMPTOMS
ALLERGIC/IMMUNOLOGIC NEGATIVE: 1
SORE THROAT: 0
COUGH: 0
EYE REDNESS: 0
NAUSEA: 0
WHEEZING: 0
RHINORRHEA: 0
EYE PAIN: 0
TROUBLE SWALLOWING: 0
BACK PAIN: 0
DIARRHEA: 0
EYE DISCHARGE: 0
ABDOMINAL PAIN: 0
VOMITING: 0
SHORTNESS OF BREATH: 0
CONSTIPATION: 0

## 2024-12-30 NOTE — PROGRESS NOTES
[facility] PROFESSIONAL SERVS  [facility]  [address] [place] [address]  Dept: [phone]  Dept Fax: [phone]  Loc: [phone]     Visit Date:  12/30/2024      Patient:  Devora Davis  YOB: 1995    HPI:     Chief Complaint   Patient presents with    Congestion     Had congestion last week but better within the last 3 days. She did have a cough and sore throat as well. She states she gets sore throats frequently so she is asking for allergy testing.     Headache     Been having more frequent headaches and thinks she has had some seizers. She states a rush of emotion and be crying when her \"seizer\" comes on. She states she has not lost consciousness from them recently- but in the past she has. She admits to having done mushrooms a few times and and one time this seizure happened. It happens without doing mushrooms as well, but it happened to her once while being on it. She has not seen a specialist for this.       Congestion Had congestion last week but better within the last 3 days. She did have a cough and sore throat as well. She states she gets sore throats frequently so she is asking for allergy testing.      Headache Been having more frequent headaches and thinks she has had some seizers. She states a rush of emotion and be crying when her \"seizer\" comes on. She states she has not lost consciousness from them recently- but in the past she has. She admits to having done mushrooms a few times and and one time this seizure happened. It happens without doing mushrooms as well, but it happened to her once while being on it. She has not seen a specialist for this.         Patient states on further inquiry that her \"seizure\" activity involves involuntary convulsions of her facial muscles bilaterally.  She states this can happen as long as 5 minutes.  She states she gets extremely tired and generally has to take a nap after this happens.  She does not

## 2025-01-11 ENCOUNTER — PATIENT MESSAGE (OUTPATIENT)
Dept: FAMILY MEDICINE CLINIC | Age: 30
End: 2025-01-11

## 2025-01-11 DIAGNOSIS — L70.0 CYSTIC ACNE: ICD-10-CM

## 2025-01-13 RX ORDER — CLINDAMYCIN HYDROCHLORIDE 300 MG/1
300 CAPSULE ORAL 3 TIMES DAILY
Qty: 21 CAPSULE | Refills: 2 | OUTPATIENT
Start: 2025-01-13 | End: 2025-02-03

## 2025-01-13 NOTE — TELEPHONE ENCOUNTER
I gave the patient his clindamycin antibiotic for her cystic acne.  The way it is written she is going to be on it 3 times a day all of the time and that is not proper treatment.  If the patient is still having problems we need to get her to dermatology

## 2025-01-14 NOTE — TELEPHONE ENCOUNTER
Pt called in today for a refill due to [facility] being closed and I did not see this message that Jhoan did not want to refill it and wants to refer her to Dermatology. I left a message on her machine to call back.

## 2025-01-29 ENCOUNTER — HOSPITAL ENCOUNTER (OUTPATIENT)
Dept: NEUROLOGY | Age: 30
Discharge: HOME | End: 2025-01-29
Payer: MEDICAID

## 2025-01-29 DIAGNOSIS — R56.9 CONVULSIONS, UNSPECIFIED CONVULSION TYPE (HCC): ICD-10-CM

## 2025-01-29 PROCEDURE — 95816 EEG AWAKE AND DROWSY: CPT
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
## 2025-01-31 ENCOUNTER — TELEPHONE (OUTPATIENT)
Dept: FAMILY MEDICINE CLINIC | Age: 30
End: 2025-01-31

## 2025-01-31 NOTE — PROCEDURES
[address] [address]                       ELECTROENCEPHALOGRAM REPORT      PATIENT NAME: MIR GRANADOS               : 1995  MED REC NO: [id number]                       ROOM:   ACCOUNT NO: [id number]                       ADMIT DATE: 2025  PROVIDER: Mulugeta Page MD      DATE OF SERVICE:  2025    REFERRING PHYSICIAN:  Ladarius Clark NP    CLINICAL HISTORY:  This is a 29-year-old male with involuntary mouth movement, afterwards experiences muscle weakness, memory issues, and tiredness.  Could not hold a conversation during the event.  At times, holds the breath during the event; is on hydroxyzine for anxiety.  History of drug abuse.  He has right eye blindness.    PAST MEDICAL HISTORY:  Also significant for ADHD, anxiety and depression.    MEDICATIONS:  Listed are Zyrtec and Strattera, in addition to marijuana.    DESCRIPTION:  This is a routine 20-minute EEG recording using the [employer] 10/20 system on [employer] workstation.  Automated spike and seizure detection algorithms were applied.  The patient is described as alert.    The background rhythm activity is noted to be 10 hertz in posterior parietal area, symmetric, well modulated, attenuates with eye opening.  Hyperventilation and photic stimulation were performed without abnormality.  Lead and muscle artifacts were noted limiting quality of data obtained.  Video EEG was started as the patient was experiencing moaning and crying that did not have an EEG correlate, was mostly muscle artifact.  There was no evidence of epileptiform activity appreciated.    IMPRESSION:  This is a normal EEG.  There was no evidence of epileptiform activity appreciated.  The patient was experienced to have crying and moaning that did not have an EEG correlate.  Clinical correlation is recommended.          MULUGETA PAGE MD      D:  2025 10:41:35     T:  2025

## 2025-01-31 NOTE — TELEPHONE ENCOUNTER
----- Message from KENDALL Goins CNP sent at 1/31/2025 11:32 AM EST -----  No seizure activity or findings noted on EEG.

## 2025-02-11 ENCOUNTER — HOSPITAL ENCOUNTER (INPATIENT)
Age: 30
LOS: 4 days | Discharge: HOME | End: 2025-02-15
Attending: PSYCHIATRY & NEUROLOGY | Admitting: PSYCHIATRY & NEUROLOGY
Payer: MEDICAID

## 2025-02-11 PROBLEM — F25.0 SCHIZOAFFECTIVE DISORDER, BIPOLAR TYPE (HCC): Status: ACTIVE | Noted: 2025-02-11

## 2025-02-11 PROCEDURE — 6370000000 HC RX 637 (ALT 250 FOR IP): Performed by: PSYCHIATRY & NEUROLOGY

## 2025-02-11 PROCEDURE — 1240000000 HC EMOTIONAL WELLNESS R&B

## 2025-02-11 PROCEDURE — 90792 PSYCH DIAG EVAL W/MED SRVCS: CPT | Performed by: PSYCHIATRY & NEUROLOGY

## 2025-02-11 PROCEDURE — 6370000000 HC RX 637 (ALT 250 FOR IP): Performed by: PHYSICIAN ASSISTANT

## 2025-02-11 RX ORDER — ARIPIPRAZOLE LAUROXIL 662 MG/2.4ML
662 INJECTION, SUSPENSION, EXTENDED RELEASE INTRAMUSCULAR
COMMUNITY
Start: 2025-01-24 | End: 2025-02-20 | Stop reason: HOSPADM

## 2025-02-11 RX ORDER — ACETAMINOPHEN 325 MG/1
650 TABLET ORAL EVERY 4 HOURS PRN
Status: DISCONTINUED | OUTPATIENT
Start: 2025-02-11 | End: 2025-02-15 | Stop reason: HOSPADM

## 2025-02-11 RX ORDER — ARIPIPRAZOLE 10 MG/1
10 TABLET ORAL DAILY
Status: DISCONTINUED | OUTPATIENT
Start: 2025-02-11 | End: 2025-02-11

## 2025-02-11 RX ORDER — IBUPROFEN 400 MG/1
400 TABLET, FILM COATED ORAL EVERY 6 HOURS PRN
Status: DISCONTINUED | OUTPATIENT
Start: 2025-02-11 | End: 2025-02-15 | Stop reason: HOSPADM

## 2025-02-11 RX ORDER — HYDROXYZINE HYDROCHLORIDE 25 MG/1
50 TABLET, FILM COATED ORAL 3 TIMES DAILY PRN
Status: DISCONTINUED | OUTPATIENT
Start: 2025-02-11 | End: 2025-02-15 | Stop reason: HOSPADM

## 2025-02-11 RX ORDER — ARIPIPRAZOLE 15 MG/1
15 TABLET ORAL DAILY
Status: DISCONTINUED | OUTPATIENT
Start: 2025-02-12 | End: 2025-02-15 | Stop reason: HOSPADM

## 2025-02-11 RX ORDER — TRAZODONE HYDROCHLORIDE 50 MG/1
50 TABLET ORAL NIGHTLY PRN
Status: DISCONTINUED | OUTPATIENT
Start: 2025-02-11 | End: 2025-02-15 | Stop reason: HOSPADM

## 2025-02-11 RX ORDER — MAGNESIUM HYDROXIDE/ALUMINUM HYDROXICE/SIMETHICONE 120; 1200; 1200 MG/30ML; MG/30ML; MG/30ML
30 SUSPENSION ORAL EVERY 6 HOURS PRN
Status: DISCONTINUED | OUTPATIENT
Start: 2025-02-11 | End: 2025-02-15 | Stop reason: HOSPADM

## 2025-02-11 RX ADMIN — TRAZODONE HYDROCHLORIDE 50 MG: 50 TABLET ORAL at 21:12

## 2025-02-11 RX ADMIN — ARIPIPRAZOLE 10 MG: 10 TABLET ORAL at 15:05

## 2025-02-11 RX ADMIN — HYDROXYZINE HYDROCHLORIDE 50 MG: 25 TABLET, FILM COATED ORAL at 21:12

## 2025-02-11 RX ADMIN — ALUMINUM HYDROXIDE, MAGNESIUM HYDROXIDE, AND SIMETHICONE 30 ML: 200; 200; 20 SUSPENSION ORAL at 16:57

## 2025-02-11 ASSESSMENT — SLEEP AND FATIGUE QUESTIONNAIRES
SLEEP PATTERN: DIFFICULTY FALLING ASLEEP;DIFFICULTY ARISING;RESTLESSNESS
DO YOU HAVE DIFFICULTY SLEEPING: YES
AVERAGE NUMBER OF SLEEP HOURS: 7
DO YOU USE A SLEEP AID: YES

## 2025-02-11 ASSESSMENT — LIFESTYLE VARIABLES
HOW MANY STANDARD DRINKS CONTAINING ALCOHOL DO YOU HAVE ON A TYPICAL DAY: PATIENT DOES NOT DRINK
HOW OFTEN DO YOU HAVE A DRINK CONTAINING ALCOHOL: NEVER

## 2025-02-11 ASSESSMENT — PATIENT HEALTH QUESTIONNAIRE - PHQ9
3. TROUBLE FALLING OR STAYING ASLEEP: NEARLY EVERY DAY
7. TROUBLE CONCENTRATING ON THINGS, SUCH AS READING THE NEWSPAPER OR WATCHING TELEVISION: SEVERAL DAYS
SUM OF ALL RESPONSES TO PHQ9 QUESTIONS 1 & 2: 4
5. POOR APPETITE OR OVEREATING: NEARLY EVERY DAY
SUM OF ALL RESPONSES TO PHQ QUESTIONS 1-9: 18
1. LITTLE INTEREST OR PLEASURE IN DOING THINGS: NEARLY EVERY DAY
9. THOUGHTS THAT YOU WOULD BE BETTER OFF DEAD, OR OF HURTING YOURSELF: NOT AT ALL
SUM OF ALL RESPONSES TO PHQ QUESTIONS 1-9: 18
SUM OF ALL RESPONSES TO PHQ QUESTIONS 1-9: 18
8. MOVING OR SPEAKING SO SLOWLY THAT OTHER PEOPLE COULD HAVE NOTICED. OR THE OPPOSITE, BEING SO FIGETY OR RESTLESS THAT YOU HAVE BEEN MOVING AROUND A LOT MORE THAN USUAL: NEARLY EVERY DAY
4. FEELING TIRED OR HAVING LITTLE ENERGY: NEARLY EVERY DAY
2. FEELING DOWN, DEPRESSED OR HOPELESS: SEVERAL DAYS
10. IF YOU CHECKED OFF ANY PROBLEMS, HOW DIFFICULT HAVE THESE PROBLEMS MADE IT FOR YOU TO DO YOUR WORK, TAKE CARE OF THINGS AT HOME, OR GET ALONG WITH OTHER PEOPLE: EXTREMELY DIFFICULT
6. FEELING BAD ABOUT YOURSELF - OR THAT YOU ARE A FAILURE OR HAVE LET YOURSELF OR YOUR FAMILY DOWN: SEVERAL DAYS
SUM OF ALL RESPONSES TO PHQ QUESTIONS 1-9: 18

## 2025-02-11 NOTE — PLAN OF CARE
Problem: Risk for Elopement  Goal: Patient will not exit the unit/facility without proper excort  Recent Flowsheet Documentation  Taken 2/11/2025 1253 by Lisa He, RN  Nursing Interventions for Elopement Risk:   Reduce environmental triggers   Make sure patient has all necessary personal care items   Shoes and clothing collected and placed in gown attire  Taken 2/11/2025 1221 by Lisa He, RN  Nursing Interventions for Elopement Risk:   Reduce environmental triggers   Make sure patient has all necessary personal care items   Shoes and clothing collected and placed in gown attire     Problem: Spiritual Care  Goal: Pt/Family able to move forward in process of forgiving self, others, and/or higher power  Description: INTERVENTIONS:  1. Assist patient/family to overcome blocks to healing by use of spiritual practices (prayer, meditation, guided imagery, reiki, breath work, etc).  2. De-myth guilt and help patient/family identify possible irrational spiritual/cultural beliefs and values.  3. Explore possibilities of making amends & reconciliation with self, others, and/or a greater power.  4. Guide patient/family in identifying painful feelings of guilt.  5. Help patient/famiy explore and identify spiritual beliefs, cultural understandings or values that may help or hinder letting go of issue.  6. Help patient/family explore feelings of anger, bitterness, resentment.  7. Help patient/family identify and examine the situation in which these feelings are experienced.  8. Help patient/family identify destructive displacement of feelings onto other individuals.  9. Invite use of sacraments/rituals/ceremonies as appropriate (e.g. - confession, anointing, smudging).  10. Refer patient/family to formal counseling and/or to memo community for further support work.  Flowsheets (Taken 2/11/2025 7523)  Patient/family able to move forward in process of forgiving self, others, and/or higher power:   Refer patient to
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
## 2025-02-11 NOTE — PROGRESS NOTES
Spiritual Support Group Note    Number of Participants in Group: 5                                    Goal:   The spirituality group focused on utilizing a day of rest in one's weekly mental health routine. This lesson focused on sabbath as a day of reflection, journaling, community, and meditation. Sabbath as a day was also approached for the purpose of teaching boundaries in relation to time. Sabbath is also a boundary on unhealthy habits that inhibit mental health. The goal in the group is to establish a day every week to sabbath. This intentional creation of a day of reflection assists with creating a space to evaluate one's development and mental health care progress by using rest. Sabbathing was encouraged as a way to see ones stay in the hospital as well. The stay in behavioral health is a form of forced sabbath.    Topic:  [x] Spiritual Wellness and Self Care                  [] Hope                     [x] Connecting with Divine/Others        [] Thankfulness and Gratitude               []  Meaningfulness and Purpose               [] Forgiveness               [] Peace               [x] Connect to Community     [] Other:    Participation Level:   [x] Active Listener   [] Minimal   [] Monopolizing   [x] Interactive   [] No Participation   []  Other:     Attention:   [x] Alert   [] Distractible   [] Drowsy   [] Poor   [] Other:    Manner:   [x] Cooperative   [] Suspicious   [] Withdrawn   [] Guarded   [] Irritable   [] Inhospitable   [] Other:

## 2025-02-11 NOTE — PROGRESS NOTES
Behavioral Services  Medicare Certification Upon Admission    I certify that this patient's inpatient psychiatric hospital admission is medically necessary for:    [x] (1) Treatment which could reasonably be expected to improve this patient's condition,       [x] (2) Or for diagnostic study;     AND     [x](2) The inpatient psychiatric services are provided while the individual is under the care of a physician and are included in the individualized plan of care.    Estimated length of stay/service 5-7 days    Plan for post-hospital care is Jo    Electronically signed by Alex Rodriguez MD on 2/11/2025 at 5:16 PM

## 2025-02-11 NOTE — GROUP NOTE
Group Therapy Note    Date: 2/11/2025    Group Start Time: 1345  Group End Time: 1415  Group Topic: Healthy Living/Wellness    [facility] Adult Psych 7E    Melody Quezada LPN        Group Therapy Note    Attendees: 8       Notes:  attended    Status After Intervention:  Improved    Participation Level: Active Listener    Participation Quality: Appropriate and Attentive      Speech:  normal      Thought Process/Content: Logical      Affective Functioning: Flat    Level of consciousness:  Alert, Oriented x4, and Attentive      Response to Learning: Able to verbalize current knowledge/experience, Able to verbalize/acknowledge new learning, Able to retain information, and Capable of insight      Endings: None Reported    Modes of Intervention: Education      Discipline Responsible: nursing students      Signature:  Melody Quezada LPN

## 2025-02-11 NOTE — PATIENT CARE CONFERENCE
Behavioral Health  Initial Interdisciplinary Treatment Plan NOTE    REVIEW DATE AND TIME: 2/11/25 1527    PATIENT was IN TREATMENT TEAM.  See Multidisciplinary Treatment Team sheet for participants.    ADMISSION TYPE:        REASON FOR ADMISSION:         Estimated Length of Stay Update:  3-5 days  Estimated Discharge Date Update: 2/13/25       Addictive Behavior:   Medical Problems:  Past Medical History:   Diagnosis Date    ADHD (attention deficit hyperactivity disorder)     When i was 9yr old prescribed to adderall    Mental disorder     anxiety and depression and possibly ADHD       EDUCATION:   Learner Progress Toward Treatment Goals: Reviewed results and recommendations of this team, Reviewed group plan and strategies, Reviewed signs, symptoms and risk of self harm and violent behavior, and Reviewed goals and plan of care    Method: Individual    Outcome: Verbalized understanding and Demonstrated Understanding    PATIENT GOALS: Medication Management    OQ PRIORITIES IDENTIFIED BY THE PATIENT ON ADMISSION: (These are the symptoms that the patient has identified that are impacting them the most at the time of admission based on their own input on the OQ.  These priorities are to be included in all of their care while admitted.)        47 - Substance Abuse  PLAN/TREATMENT RECOMMENDATIONS UPDATE:   What is the most important thing we can help you with while you are here? See above  Who is your support system? Mother  Do you have follow-up providers? [facility] in [place]  Do you have the ability to pay for your medications? Yes, [employer]  Where will you be residing when you leave the hospital? With mother  Will need a return to work slip or FMLA paper completion? No      GOALS UPDATE:   Time frame for Short-Term Goals: Daily     ACACIA Richey

## 2025-02-12 PROCEDURE — 90833 PSYTX W PT W E/M 30 MIN: CPT | Performed by: PSYCHIATRY & NEUROLOGY

## 2025-02-12 PROCEDURE — 99232 SBSQ HOSP IP/OBS MODERATE 35: CPT | Performed by: PSYCHIATRY & NEUROLOGY

## 2025-02-12 PROCEDURE — 6370000000 HC RX 637 (ALT 250 FOR IP): Performed by: PSYCHIATRY & NEUROLOGY

## 2025-02-12 PROCEDURE — 1240000000 HC EMOTIONAL WELLNESS R&B

## 2025-02-12 PROCEDURE — APPSS30 APP SPLIT SHARED TIME 16-30 MINUTES: Performed by: PHYSICIAN ASSISTANT

## 2025-02-12 RX ORDER — POLYETHYLENE GLYCOL 3350 17 G
2 POWDER IN PACKET (EA) ORAL
Status: DISCONTINUED | OUTPATIENT
Start: 2025-02-12 | End: 2025-02-15 | Stop reason: HOSPADM

## 2025-02-12 RX ADMIN — NICOTINE POLACRILEX 2 MG: 2 LOZENGE ORAL at 14:25

## 2025-02-12 RX ADMIN — HYDROXYZINE HYDROCHLORIDE 50 MG: 25 TABLET, FILM COATED ORAL at 18:07

## 2025-02-12 RX ADMIN — ACETAMINOPHEN 650 MG: 325 TABLET ORAL at 20:46

## 2025-02-12 RX ADMIN — ARIPIPRAZOLE 15 MG: 15 TABLET ORAL at 08:48

## 2025-02-12 RX ADMIN — NICOTINE POLACRILEX 2 MG: 2 LOZENGE ORAL at 20:46

## 2025-02-12 ASSESSMENT — PAIN SCALES - GENERAL
PAINLEVEL_OUTOF10: 0
PAINLEVEL_OUTOF10: 5

## 2025-02-12 ASSESSMENT — PAIN DESCRIPTION - LOCATION: LOCATION: HIP

## 2025-02-12 ASSESSMENT — PAIN SCALES - WONG BAKER: WONGBAKER_NUMERICALRESPONSE: NO HURT

## 2025-02-12 NOTE — PLAN OF CARE
Problem: Risk for Elopement  Goal: Patient will not exit the unit/facility without proper excort  Outcome: Progressing  Flowsheets  Taken 2/11/2025 2236 by Ben Cruz, CRISTIAN  Nursing Interventions for Elopement Risk:   Assist with personal care needs such as toileting, eating, dressing, as needed to reduce the risk of wandering   Collaborate with family members/caregivers to mitigate the elopement risk   Collaborate with treatment team for nicotine replacement  Taken 2/11/2025 2221 by Ben Cruz, CRISTIAN  Nursing Interventions for Elopement Risk:   Reduce environmental triggers   Make sure patient has all necessary personal care items   Shoes and clothing collected and placed in gown attire  Taken 2/11/2025 1253 by Lisa He, RN  Nursing Interventions for Elopement Risk:   Reduce environmental triggers   Make sure patient has all necessary personal care items   Shoes and clothing collected and placed in gown attire  Taken 2/11/2025 1221 by Lisa He, RN  Nursing Interventions for Elopement Risk:   Reduce environmental triggers   Make sure patient has all necessary personal care items   Shoes and clothing collected and placed in gown attire     Problem: Safety - Adult  Goal: Free from fall injury  Outcome: Progressing  Flowsheets (Taken 2/11/2025 2236)  Free From Fall Injury:   Based on caregiver fall risk screen, instruct family/caregiver to ask for assistance with transferring infant if caregiver noted to have fall risk factors   Instruct family/caregiver on patient safety  Note: Patient is free from fall .      Problem: Discharge Planning  Goal: Discharge to home or other facility with appropriate resources  Outcome: Progressing  Flowsheets (Taken 2/11/2025 2221)  Discharge to home or other facility with appropriate resources:   Identify barriers to discharge with patient and caregiver   Arrange for needed discharge resources and transportation as appropriate  Note: Discharge plan  (prayer, meditation, guided imagery, reiki, breath work, etc)   Explore possibilities of making amends and reconciliation with self, others, and/or a greater power  2/11/2025 1449 by Lisa He RN  Flowsheets (Taken 2/11/2025 1449)  Patient/family able to move forward in process of forgiving self, others, and/or higher power:   Refer patient to formal counseling and/or to memo community for further support work   Assist patient to overcome blocks to healing by use of spiritual practices (prayer, meditation, guided imagery, reiki, breath work, etc)
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
## 2025-02-12 NOTE — PROGRESS NOTES
Patient reports goal for today is to make it to all the groups.  Patient continues to work towards goal.

## 2025-02-12 NOTE — PROGRESS NOTES
Group Therapy Note    Date: 4/17/2023  Start Time: 0930  End Time:  1030  Number of Participants: 9    Type of Group: Psychotherapy    Notes:  Patient was pleasant and actively engaged in the group conversation of addiction. Group members independently verbally identified what addiction looks like, and what could be considered as an addiction. Group members were educated on Maslow's hierarchy of needs and how this will benefit their lives in the community. Pt. Will continue to be encouraged to participate in planned psychotherapy session.     Status After Intervention:  Improved    Participation Level: Active Listener, Interactive, and Minimal    Participation Quality: Appropriate, Attentive, Sharing, and Supportive      Speech:  normal      Thought Process/Content: Logical  Linear      Affective Functioning: Congruent      Mood: euthymic      Level of consciousness:  Alert, Oriented x4, and Attentive      Response to Learning: Able to verbalize current knowledge/experience, Able to verbalize/acknowledge new learning, Able to retain information, Capable of insight, Able to change behavior, and Progressing to goal      Endings: None Reported    Modes of Intervention: Education, Support, Socialization, Exploration, Clarifying, and Problem-solving      Discipline Responsible: /Counselor      Signature:  HORACE Frausto

## 2025-02-12 NOTE — BH NOTE
Goal Wrap-Up/Relaxation Group    Date: 2/11/2025  Start Time: 2000  End Time:  2020    Type of Group: Relaxation    Patient Participated in group/activities appropriately      Signature: Teagan Hawley RN

## 2025-02-13 PROCEDURE — 1240000000 HC EMOTIONAL WELLNESS R&B

## 2025-02-13 PROCEDURE — 90833 PSYTX W PT W E/M 30 MIN: CPT | Performed by: PSYCHIATRY & NEUROLOGY

## 2025-02-13 PROCEDURE — 99232 SBSQ HOSP IP/OBS MODERATE 35: CPT | Performed by: PSYCHIATRY & NEUROLOGY

## 2025-02-13 PROCEDURE — 6370000000 HC RX 637 (ALT 250 FOR IP): Performed by: PSYCHIATRY & NEUROLOGY

## 2025-02-13 PROCEDURE — APPSS30 APP SPLIT SHARED TIME 16-30 MINUTES: Performed by: PHYSICIAN ASSISTANT

## 2025-02-13 RX ORDER — DOXEPIN HYDROCHLORIDE 10 MG/1
10 CAPSULE ORAL NIGHTLY PRN
Status: DISCONTINUED | OUTPATIENT
Start: 2025-02-13 | End: 2025-02-14

## 2025-02-13 RX ADMIN — TRAZODONE HYDROCHLORIDE 50 MG: 50 TABLET ORAL at 20:45

## 2025-02-13 RX ADMIN — NICOTINE POLACRILEX 2 MG: 2 LOZENGE ORAL at 16:38

## 2025-02-13 RX ADMIN — ARIPIPRAZOLE 15 MG: 15 TABLET ORAL at 08:54

## 2025-02-13 RX ADMIN — MAGNESIUM HYDROXIDE 30 ML: 1200 LIQUID ORAL at 11:20

## 2025-02-13 RX ADMIN — HYDROXYZINE HYDROCHLORIDE 50 MG: 25 TABLET, FILM COATED ORAL at 17:55

## 2025-02-13 RX ADMIN — NICOTINE POLACRILEX 2 MG: 2 LOZENGE ORAL at 08:11

## 2025-02-13 ASSESSMENT — PAIN DESCRIPTION - ONSET: ONSET: GRADUAL

## 2025-02-13 ASSESSMENT — PAIN SCALES - GENERAL: PAINLEVEL_OUTOF10: 5

## 2025-02-13 ASSESSMENT — PAIN DESCRIPTION - ORIENTATION: ORIENTATION: RIGHT

## 2025-02-13 ASSESSMENT — PAIN DESCRIPTION - FREQUENCY: FREQUENCY: INTERMITTENT

## 2025-02-13 ASSESSMENT — PAIN DESCRIPTION - DESCRIPTORS: DESCRIPTORS: ACHING

## 2025-02-13 ASSESSMENT — PAIN - FUNCTIONAL ASSESSMENT: PAIN_FUNCTIONAL_ASSESSMENT: ACTIVITIES ARE NOT PREVENTED

## 2025-02-13 ASSESSMENT — PAIN DESCRIPTION - LOCATION: LOCATION: HIP

## 2025-02-13 NOTE — GROUP NOTE
Group Therapy Note    Date: 2/13/2025    Group Start Time: 1100  Group End Time: 1130  Group Topic: Recreational    [facility]    Edgar Patel CTRS        Group Therapy Note    Attendees: 9     Patient's Goal:  To enhance participants' knowledge about mental health topics by engaging in a Mental Health Jeopardy game, improving awareness of key mental health issues    Notes: Pt. Was actively engaged in a Mental Health Jeopardy game participants will be able to correctly answer related to mental health topics, demonstrating improved understanding of key mental health concepts such as true or false statements regarding mental health, coping strategies, and available mental health resources, Goal setting and conversations regarding policy and procedures on the unit.     Status After Intervention:  Improved    Participation Level: Active Listener, Interactive, and Minimal    Participation Quality: Appropriate, Attentive, Sharing, and Supportive      Speech:  normal      Thought Process/Content: Logical  Linear      Affective Functioning: Flat      Mood: euthymic      Level of consciousness:  Alert, Oriented x4, and Attentive      Response to Learning: Able to verbalize current knowledge/experience, Able to verbalize/acknowledge new learning, Able to retain information, Capable of insight, and Able to change behavior      Endings: None Reported    Modes of Intervention: Education, Support, Socialization, Exploration, Clarifying, and Problem-solving      Discipline Responsible: Recreational Therapist      Signature:  PRITI Pattesron

## 2025-02-13 NOTE — GROUP NOTE
Group Therapy Note    Date: 2/13/2025    Group Start Time: 0900  Group End Time: 0930  Group Topic: Community Meeting    [facility]    Edgar Patel CTRS        Group Therapy Note    Attendees: 10       Patient's Goal:  Talk to the doctor about meds    Notes:  Pt is an active participant in group conversation with verbal cues and prompting from writer. Without verbal prompt, patient is an active listener.     Status After Intervention:  Improved    Participation Level: Active Listener and Interactive    Participation Quality: Appropriate, Attentive, Sharing, and Supportive      Speech:  normal      Thought Process/Content: Logical  Linear      Affective Functioning: Congruent      Mood: euthymic      Level of consciousness:  Alert, Oriented x4, and Attentive      Response to Learning: Able to verbalize current knowledge/experience      Endings: None Reported    Modes of Intervention: Education, Support, Socialization, Exploration, and Clarifying      Discipline Responsible: Recreational Therapist      Signature:  PRITI Patterson
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
## 2025-02-13 NOTE — PROGRESS NOTES
Group Therapy Note    Date: 2/13/2025  Start Time: 0930  End Time:  1030  Number of Participants: 9    Type of Group: Psychotherapy    Notes:  Patient was present in group. Group members discussed the stigma of mental health in regards to them seeking treatment and outside resources. Members identified community resources to utilize once discharged.     Status After Intervention:  Improved    Participation Level: Active Listener and Interactive    Participation Quality: Appropriate, Attentive, Sharing, and Supportive      Speech:  normal      Thought Process/Content: Logical  Linear      Affective Functioning: Congruent      Mood: euthymic      Level of consciousness:  Alert, Oriented x4, and Attentive      Response to Learning: Able to verbalize current knowledge/experience, Able to verbalize/acknowledge new learning, Able to retain information, Capable of insight, Able to change behavior, and Progressing to goal      Endings: None Reported    Modes of Intervention: Education, Support, Socialization, Exploration, Clarifying, and Problem-solving      Discipline Responsible: /Counselor      Signature:  ACACIA Blount

## 2025-02-13 NOTE — PLAN OF CARE
Problem: Sleep Disturbance  Goal: Will exhibit normal sleeping pattern  Description: INTERVENTIONS:  1. Administer medication as ordered  2. Decrease environmental stimuli, including noise, as appropriate  3. Discourage social isolation and naps during the day  2/13/2025 0122 by Deepa Millan LPN  Outcome: Not Progressing  Note: Patient stated Trazodone makes her feel groggy, patient asked for Ativan several times then asked for Seroquel.   2/12/2025 2016 by Sheryl Ku, RN  Outcome: Progressing     Problem: Risk for Elopement  Goal: Patient will not exit the unit/facility without proper excort  2/13/2025 0122 by Deepa Millan LPN  Outcome: Progressing  Flowsheets (Taken 2/12/2025 0800 by Sheryl Ku, RN)  Nursing Interventions for Elopement Risk:   Make sure patient has all necessary personal care items   Reduce environmental triggers  2/12/2025 2016 by Sheryl Ku, RN  Outcome: Progressing  Flowsheets (Taken 2/12/2025 0800)  Nursing Interventions for Elopement Risk:   Make sure patient has all necessary personal care items   Reduce environmental triggers     Problem: Safety - Adult  Goal: Free from fall injury  2/13/2025 0122 by Deepa Millan LPN  Outcome: Progressing  Flowsheets (Taken 2/11/2025 2236 by Ben Cruz, RN)  Free From Fall Injury:   Based on caregiver fall risk screen, instruct family/caregiver to ask for assistance with transferring infant if caregiver noted to have fall risk factors   Instruct family/caregiver on patient safety  2/12/2025 2016 by Sheryl Ku, RN  Outcome: Progressing     Problem: Discharge Planning  Goal: Discharge to home or other facility with appropriate resources  2/13/2025 0122 by Deepa Millan LPN  Outcome: Progressing  Flowsheets (Taken 2/13/2025 0118)  Discharge to home or other facility with appropriate resources: Identify barriers to discharge with patient and caregiver  2/12/2025 2016 by Sheryl Ku, RN  Outcome:  Progressing  Flowsheets (Taken 2/12/2025 0800)  Discharge to home or other facility with appropriate resources:   Identify barriers to discharge with patient and caregiver   Arrange for needed discharge resources and transportation as appropriate   Arrange for interpreters to assist at discharge as needed     Problem: Self Harm/Suicidality  Goal: Will have no self-injury during hospital stay  Description: INTERVENTIONS:  1.  Ensure constant observer at bedside with Q15M safety checks  2.  Maintain a safe environment  3.  Secure patient belongings  4.  Ensure family/visitors adhere to safety recommendations  5.  Ensure safety tray has been added to patient's diet order  6.  Every shift and PRN: Re-assess suicidal risk via Frequent Screener    2/13/2025 0122 by Deepa Millan LPN  Outcome: Progressing  Flowsheets (Taken 2/13/2025 0118)  Will have no self-injury during hospital stay: Maintain a safe environment  2/12/2025 2016 by Sheryl Ku, RN  Outcome: Progressing  Flowsheets (Taken 2/12/2025 0800)  Will have no self-injury during hospital stay:   Maintain a safe environment   Every shift and PRN: Re-assess suicidal risk via Frequent Screener     Problem: Depression  Goal: Will be euthymic at discharge  Description: INTERVENTIONS:  1. Administer medication as ordered  2. Provide emotional support via 1:1 interaction with staff  3. Encourage involvement in milieu/groups/activities  4. Monitor for social isolation  2/13/2025 0122 by Deepa Millan LPN  Outcome: Progressing  Note: Patient denies depression   2/12/2025 2016 by Sheryl Ku, RN  Outcome: Progressing     Problem: Involuntary Admit  Goal: Will cooperate with staff recommendations and doctor's orders and will demonstrate appropriate behavior  Description: INTERVENTIONS:  1. Treat underlying conditions and offer medication as ordered  2. Educate regarding involuntary admission procedures and rules  3. Contain excessive/inappropriate behavior per
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
## 2025-02-13 NOTE — BH NOTE
This RN has reviewed and agrees with PAGE Millan LPN's data collection and has collaborated with this LPN regarding the patient's care plan.

## 2025-02-13 NOTE — PROGRESS NOTES
Spiritual Support Group Note    Number of Participants in Group: 2                                  Goal: The course today focused on guilt words and how to work on ones inner monologue to assist in behavioral health.  This care focused on guilt words like Would, Could and should and how they can signs of moral injury. This was explored and how changes of perspectives can enable a healthier life. The participants also explored re framing PTSD and avoiding addiction triggers in our own heads.       Topic:  [x] Spiritual Wellness and Self Care                  [] Hope                     [] Connecting with Divine/Others        [] Thankfulness and Gratitude               [x]  Meaningfulness and Purpose               [] Forgiveness               [x] Peace               [] Connect to Community     [] Other:    Participation Level:   [x] Active Listener   [] Minimal   [] Monopolizing   [x] Interactive   [] No Participation   []  Other:     Attention:   [x] Alert   [] Distractible   [] Drowsy   [] Poor   [] Other:    Manner:   [x] Cooperative   [] Suspicious   [] Withdrawn   [] Guarded   [] Irritable   [] Inhospitable   [] Other:

## 2025-02-13 NOTE — PROGRESS NOTES
Goal Wrap-Up/Relaxation Group    Date: 2/12/2025  Start Time: 2000  End Time:  2020    Type of Group: Goal Wrap Up and Relaxation    States that goal today was: make it to all the groups     Goal for today was Met    Patient Participated in group/activities appropriately      Signature: Deepa Millan LPN

## 2025-02-13 NOTE — PLAN OF CARE
Problem: Coping  Goal: Pt/Family able to verbalize concerns and demonstrate effective coping strategies  Description: INTERVENTIONS:  1. Assist patient/family to identify coping skills, available support systems and cultural and spiritual values  2. Provide emotional support, including active listening and acknowledgement of concerns of patient and caregivers  3. Reduce environmental stimuli, as able  4. Instruct patient/family in relaxation techniques, as appropriate  5. Assess for spiritual pain/suffering and initiate Spiritual Care, Psychosocial Clinical Specialist consults as needed  Outcome: Progressing  Flowsheets (Taken 2/13/2025 1436)  Patient/family able to verbalize anxieties, fears, and concerns, and demonstrate effective coping:   Assist patient/family to identify coping skills, available support systems and cultural and spiritual values   Provide emotional support, including active listening and acknowledgement of concerns of patient and caregivers  Note: Pt has attended 5/5 group therapy sessions offered thus far today on the unit. Pt is engaging in group therapy conversation independently and socializes appropriately with his peers. Pt has been seen out on the unit interacting with others and participating in independent recreation resources out on the unit appropriately. Plan to continue to monitor progress and encourage group therapy participation and socialization on the unit.       Problem: Sleep Disturbance  Goal: Will exhibit normal sleeping pattern  Description: INTERVENTIONS:  1. Administer medication as ordered  2. Decrease environmental stimuli, including noise, as appropriate  3. Discourage social isolation and naps during the day  2/13/2025 1348 by Swati Ceron RN  Note: Reports waking up a few times throughout the night.   2/13/2025 0122 by Deepa Millan LPN  Outcome: Not Progressing  Note: Patient stated Trazodone makes her feel groggy, patient asked for Ativan several times  then asked for Seroquel.

## 2025-02-13 NOTE — PATIENT CARE CONFERENCE
Behavioral Health   Day 3 Interdisciplinary Treatment Plan NOTE    Review Date & Time: 2/13/25 0904    Patient was in treatment team    Admission Type:   Admission Type: Involuntary    Reason for admission:  Reason for Admission: \"i trie calling the  to report a crime and it ended with me beng in the hopsital.  Estimated Length of Stay Update:  2-3 days  Estimated Discharge Date Update: 2/17/25    Patient Strengths/Barriers  Strengths (Must Choose Two): Other (comment) (adaptablity and reading)  Barriers: Recreational/leisure/hobbies, Support from family  Addictive Behavior:Addictive Behavior  In the Past 3 Months, Have You Felt or Has Someone Told You That You Have a Problem With  : None  Medical Problems:  Past Medical History:   Diagnosis Date    ADHD (attention deficit hyperactivity disorder)     When i was 9yr old prescribed to adderall    Mental disorder     anxiety and depression and possibly ADHD       Risk:  Fall Risk   Feng Scale Feng Scale Score: 22    Status EXAM:   Mental Status and Behavioral Exam  Normal: No  Level of Assistance: Independent/Self  Facial Expression: Flat  Affect: Blunt  Level of Consciousness: Alert  Frequency of Checks: 4 times per hour, close  Mood:Normal: No  Mood: Anxious, Suspicious (patient states mood 3/10 with 10 being the best)  Motor Activity:Normal: Yes  Eye Contact: Good  Observed Behavior: Friendly, Cooperative  Sexual Misconduct History: Current - no  Preception: [place] to time, [place] to place, [place] to person  Attention:Normal: No  Attention: Distractible  Thought Processes: Blocking  Thought Content:Normal: No  Thought Content: Paranoia  Depression Symptoms: No problems reported or observed.  Anxiety Symptoms: Generalized  Gabriela Symptoms: No problems reported or observed.  Hallucinations: Auditory (comment) (patient stated she heard voices this morning but they went away, she doesnt know what they were saying because she tries to block them out.)  Delusions:

## 2025-02-13 NOTE — PLAN OF CARE
Problem: Risk for Elopement  Goal: Patient will not exit the unit/facility without proper excort  Outcome: Progressing  Flowsheets (Taken 2/12/2025 0800)  Nursing Interventions for Elopement Risk:   Make sure patient has all necessary personal care items   Reduce environmental triggers     Problem: Safety - Adult  Goal: Free from fall injury  Outcome: Progressing     Problem: Discharge Planning  Goal: Discharge to home or other facility with appropriate resources  Outcome: Progressing  Flowsheets (Taken 2/12/2025 0800)  Discharge to home or other facility with appropriate resources:   Identify barriers to discharge with patient and caregiver   Arrange for needed discharge resources and transportation as appropriate   Arrange for interpreters to assist at discharge as needed     Problem: Self Harm/Suicidality  Goal: Will have no self-injury during hospital stay  Description: INTERVENTIONS:  1.  Ensure constant observer at bedside with Q15M safety checks  2.  Maintain a safe environment  3.  Secure patient belongings  4.  Ensure family/visitors adhere to safety recommendations  5.  Ensure safety tray has been added to patient's diet order  6.  Every shift and PRN: Re-assess suicidal risk via Frequent Screener    Outcome: Progressing  Flowsheets (Taken 2/12/2025 0800)  Will have no self-injury during hospital stay:   Maintain a safe environment   Every shift and PRN: Re-assess suicidal risk via Frequent Screener     Problem: Depression  Goal: Will be euthymic at discharge  Description: INTERVENTIONS:  1. Administer medication as ordered  2. Provide emotional support via 1:1 interaction with staff  3. Encourage involvement in milieu/groups/activities  4. Monitor for social isolation  Outcome: Progressing     Problem: Sleep Disturbance  Goal: Will exhibit normal sleeping pattern  Description: INTERVENTIONS:  1. Administer medication as ordered  2. Decrease environmental stimuli, including noise, as appropriate  3.  work.  Outcome: Progressing  Flowsheets (Taken 2/12/2025 0800)  Patient/family able to move forward in process of forgiving self, others, and/or higher power:   Help patient/family identify and examine the situation in which these feelings are experienced   Help patient/family identify destructive displacement of feelings onto other individuals  Care plan reviewed with patient.  Patient does verbalize understanding of the plan of care and does contribute to goal setting

## 2025-02-13 NOTE — PLAN OF CARE
Problem: Sleep Disturbance  Goal: Will exhibit normal sleeping pattern  Description: INTERVENTIONS:  1. Administer medication as ordered  2. Decrease environmental stimuli, including noise, as appropriate  3. Discourage social isolation and naps during the day  2/13/2025 1348 by Swati Ceron RN  Note: Reports waking up a few times throughout the night.      Problem: Risk for Elopement  Goal: Patient will not exit the unit/facility without proper excort  2/13/2025 1348 by Swati Ceron RN  Flowsheets (Taken 2/13/2025 1245)  Nursing Interventions for Elopement Risk:   Make sure patient has all necessary personal care items   Reduce environmental triggers  Note: Patient has not been observed to be checking the exit doors or demonstrating behaviors of attempting to leave the unit.       Problem: Safety - Adult  Goal: Free from fall injury  2/13/2025 1348 by Swati Ceron RN  Note: No falls were observed or reported so far this shift, gait steady when ambulating and wears non-skid slippers socks. Remains on fall precautions.       Problem: Discharge Planning  Goal: Discharge to home or other facility with appropriate resources  2/13/2025 1348 by Swati Ceron RN  Flowsheets (Taken 2/13/2025 1245)  Discharge to home or other facility with appropriate resources:   Identify barriers to discharge with patient and caregiver   Arrange for needed discharge resources and transportation as appropriate  Note: Discharge planning in process. Reports she lives with her mom and follow ups at [facility].     Problem: Self Harm/Suicidality  Goal: Will have no self-injury during hospital stay  Description: INTERVENTIONS:  1.  Ensure constant observer at bedside with Q15M safety checks  2.  Maintain a safe environment  3.  Secure patient belongings  4.  Ensure family/visitors adhere to safety recommendations  5.  Ensure safety tray has been added to patient's diet order  6.  Every shift and PRN: Re-assess  meditation, guided imagery, reiki, breath work, etc).  2. De-myth guilt and help patient/family identify possible irrational spiritual/cultural beliefs and values.  3. Explore possibilities of making amends & reconciliation with self, others, and/or a greater power.  4. Guide patient/family in identifying painful feelings of guilt.  5. Help patient/famiy explore and identify spiritual beliefs, cultural understandings or values that may help or hinder letting go of issue.  6. Help patient/family explore feelings of anger, bitterness, resentment.  7. Help patient/family identify and examine the situation in which these feelings are experienced.  8. Help patient/family identify destructive displacement of feelings onto other individuals.  9. Invite use of sacraments/rituals/ceremonies as appropriate (e.g. - confession, anointing, smudging).  10. Refer patient/family to formal counseling and/or to memo community for further support work.  2/13/2025 0122 by Deepa Millan LPN  Outcome: Progressing  Flowsheets (Taken 2/13/2025 0118)  Patient/family able to move forward in process of forgiving self, others, and/or higher power: Assist patient to overcome blocks to healing by use of spiritual practices (prayer, meditation, guided imagery, reiki, breath work, etc)     Problem: Pain  Goal: Verbalizes/displays adequate comfort level or baseline comfort level  Note: Reports left hip pain, rating the pain 3 out of 10 with 10 being the worse pain.      Problem: Sleep Disturbance  Goal: Will exhibit normal sleeping pattern  Description: INTERVENTIONS:  1. Administer medication as ordered  2. Decrease environmental stimuli, including noise, as appropriate  3. Discourage social isolation and naps during the day  2/13/2025 1348 by Swati Ceron RN  Note: Reports waking up a few times throughout the night.   2/13/2025 0122 by Deepa Millan LPN  Outcome: Not Progressing  Note: Patient stated Trazodone makes her feel groggy,

## 2025-02-14 PROCEDURE — 6370000000 HC RX 637 (ALT 250 FOR IP): Performed by: PSYCHIATRY & NEUROLOGY

## 2025-02-14 PROCEDURE — 99232 SBSQ HOSP IP/OBS MODERATE 35: CPT | Performed by: PSYCHIATRY & NEUROLOGY

## 2025-02-14 PROCEDURE — APPSS30 APP SPLIT SHARED TIME 16-30 MINUTES: Performed by: PHYSICIAN ASSISTANT

## 2025-02-14 PROCEDURE — 1240000000 HC EMOTIONAL WELLNESS R&B

## 2025-02-14 RX ORDER — DOXEPIN HYDROCHLORIDE 25 MG/1
25 CAPSULE ORAL NIGHTLY
Status: DISCONTINUED | OUTPATIENT
Start: 2025-02-14 | End: 2025-02-15 | Stop reason: HOSPADM

## 2025-02-14 RX ADMIN — DOXEPIN HYDROCHLORIDE 25 MG: 25 CAPSULE ORAL at 21:45

## 2025-02-14 RX ADMIN — ARIPIPRAZOLE 15 MG: 15 TABLET ORAL at 08:15

## 2025-02-14 RX ADMIN — NICOTINE POLACRILEX 2 MG: 2 LOZENGE ORAL at 09:43

## 2025-02-14 RX ADMIN — ACETAMINOPHEN 650 MG: 325 TABLET ORAL at 12:02

## 2025-02-14 RX ADMIN — NICOTINE POLACRILEX 2 MG: 2 LOZENGE ORAL at 19:32

## 2025-02-14 RX ADMIN — HYDROXYZINE HYDROCHLORIDE 50 MG: 25 TABLET, FILM COATED ORAL at 21:45

## 2025-02-14 RX ADMIN — HYDROXYZINE HYDROCHLORIDE 50 MG: 25 TABLET, FILM COATED ORAL at 09:19

## 2025-02-14 ASSESSMENT — PAIN DESCRIPTION - ONSET: ONSET: GRADUAL

## 2025-02-14 ASSESSMENT — PAIN DESCRIPTION - LOCATION
LOCATION: HEAD
LOCATION: HIP

## 2025-02-14 ASSESSMENT — PAIN - FUNCTIONAL ASSESSMENT: PAIN_FUNCTIONAL_ASSESSMENT: ACTIVITIES ARE NOT PREVENTED

## 2025-02-14 ASSESSMENT — PAIN DESCRIPTION - DESCRIPTORS: DESCRIPTORS: ACHING

## 2025-02-14 ASSESSMENT — PAIN SCALES - GENERAL
PAINLEVEL_OUTOF10: 2
PAINLEVEL_OUTOF10: 2
PAINLEVEL_OUTOF10: 5

## 2025-02-14 ASSESSMENT — PAIN DESCRIPTION - ORIENTATION: ORIENTATION: RIGHT

## 2025-02-14 ASSESSMENT — PAIN DESCRIPTION - FREQUENCY: FREQUENCY: INTERMITTENT

## 2025-02-14 NOTE — PROGRESS NOTES
Department of Psychiatry  Progress Note     Chief Complaint:  psychosis     PROGRESS:  Patient appears to be seeking Ambien. She has been asking for Ambien repeatedly despite being told multiple times that we would not initiate it here on the unit. Per Deepa SHEPPARD note last night:  \"02:35 Patient presented to the Nurses station appearing anxious and somewhat delirious wanting to talk with nurse about something she just remembered,\" I think the reason I can't sleep is that I had a test ran last week and it showed that I have sleep apnea, that's probably why the Trazodone doesn't work. That's why I need Ambien. Can you get a hold of the Doctor and see if I can get that. I wanted to say something to you tonight that way I don't have to wait and ask him in the morning.\"    Devora was seen out on the unit.  She was receptive to interaction and cooperative with the interview.  Devora reported that she was doing good today.   She reported that she was feeling better than she was prior to admission. She was asking if she could be discharged today.   She was asked about her delusions regarding her mother.  She denied having any thoughts that her mom was abusing her or her son.  She denied any hallucinations today  She denied any active suicidal or homicidal ideation  She reported that she slept better last night but still woke up a few times last night.  Staff reported she slept 7.5 hours broken.  She again asked for Ambien today.  She was again informed her that we will not be initiating Ambien here for her on the unit. This is the 3rd day in a row that she has asked for Ambien. She was informed that Doxepin was ordered for her last night. She did not utilize it.   She reported that she has been eating good  She has been compliant with her medication.  She denied any side effects.    She has been out on the unit at times and has been attending groups   She said she has been talking her mom on phone       Suicidal ideations:  The patient (or guardian if applicable) is aware that this is a billable service, which includes applicable co-pays. This Virtual Visit was conducted with patient's (and/or legal guardian's) consent. Patient identification was verified, and a caregiver was present when appropriate.   The patient was located at Facility (Appt Department): [address] [place] [address]  Provider was located at Home (Appt Dept State):  [facility]  Confirm you are appropriately licensed, registered, or certified to deliver care in the state where the patient is located as indicated above. If you are not or unsure, please re-schedule the visit: Yes, I confirm.      --Sai Hayes MD on 2/14/2025 at 2:00 PM    An electronic signature was used to authenticate this note.     **This report has been created using voice recognition software. It may contain minor errors which are inherent in voice recognition technology.**

## 2025-02-14 NOTE — PROGRESS NOTES
02:35 Patient presented to the Nurses station appearing anxious and somewhat delirious wanting to talk with nurse about something she just remembered,\" I think the reason I can't sleep is that I had a test ran last week and it showed that I have sleep apnea, that's probably why the Trazodone doesn't work. That's why I need Ambien. Can you get a hold of the Doctor and see if I can get that. I wanted to say something to you tonight that way I don't have to wait and ask him in the morning.\" Patient was thanked for providing the information and that the nurse will look into it.

## 2025-02-14 NOTE — PROGRESS NOTES
Goal Wrap-Up/Relaxation Group    Date: 2/13/2025  Start Time: 2000  End Time:  2020    Type of Group: Goal Wrap Up and Relaxation    States that goal today was: keep taking meds and write down list of goals she would like to work towards when back home.     Goal for today was Still working on it     Patient Participated in group/activities appropriately      Signature: Deepa Millan LPN

## 2025-02-14 NOTE — PROGRESS NOTES
Discharge planning- Devora is to go to [place] for a psychiatry appointment with Dr. Acosta on Wednesday, Feburary 26 at 11:00am.

## 2025-02-14 NOTE — PLAN OF CARE
Problem: Risk for Elopement  Goal: Patient will not exit the unit/facility without proper excort  2/14/2025 1321 by Leslie Zhang RN  Outcome: Progressing  Note: Patient has not been observed to be checking the exit doors or demonstrating behaviors of attempting to leave the unit.    2/14/2025 0036 by Deepa Millan LPN  Outcome: Progressing  Flowsheets (Taken 2/13/2025 1245 by Swati Ceron, RN)  Nursing Interventions for Elopement Risk:   Make sure patient has all necessary personal care items   Reduce environmental triggers  Note: Patient has not been observed to be checking the exit doors or demonstrating behaviors of attempting to leave the unit.     Problem: Safety - Adult  Goal: Free from fall injury  2/14/2025 1321 by Leslie Zhang RN  Outcome: Progressing  Note: No falls,  2/14/2025 0036 by Deepa Millan LPN  Outcome: Progressing  Flowsheets (Taken 2/11/2025 2236 by Ben Cruz RN)  Free From Fall Injury:   Based on caregiver fall risk screen, instruct family/caregiver to ask for assistance with transferring infant if caregiver noted to have fall risk factors   Instruct family/caregiver on patient safety  Note: No falls were observed or reported so far this shift, gait steady when ambulating and wears non-skid slippers socks. Remains on fall precautions.       Problem: Discharge Planning  Goal: Discharge to home or other facility with appropriate resources  2/14/2025 1321 by Leslie Zhang RN  Outcome: Progressing  Note: Remains in hospital, discussed possible discharge needs.  Plans to return home with her mother.  2/14/2025 0036 by Deepa Millan LPN  Outcome: Progressing  Flowsheets (Taken 2/13/2025 1245 by Swati Ceron RN)  Discharge to home or other facility with appropriate resources:   Identify barriers to discharge with patient and caregiver   Arrange for needed discharge resources and transportation as appropriate  Note: Discharge planning in process.

## 2025-02-14 NOTE — PROGRESS NOTES
LEIGHTON has reviewed and agrees with RINKU Millan LPN's shift   Assessment.    Tawana Zhu, RN  2/14/2025

## 2025-02-14 NOTE — PLAN OF CARE
Problem: Risk for Elopement  Goal: Patient will not exit the unit/facility without proper excort  2/14/2025 0036 by Deepa Millan LPN  Outcome: Progressing  Flowsheets (Taken 2/13/2025 1245 by Swati Ceron, RN)  Nursing Interventions for Elopement Risk:   Make sure patient has all necessary personal care items   Reduce environmental triggers  Note: Patient has not been observed to be checking the exit doors or demonstrating behaviors of attempting to leave the unit.  2/13/2025 1348 by Swati Ceron RN  Flowsheets (Taken 2/13/2025 1245)  Nursing Interventions for Elopement Risk:   Make sure patient has all necessary personal care items   Reduce environmental triggers  Note: Patient has not been observed to be checking the exit doors or demonstrating behaviors of attempting to leave the unit.       Problem: Safety - Adult  Goal: Free from fall injury  2/14/2025 0036 by Deepa Millan LPN  Outcome: Progressing  Flowsheets (Taken 2/11/2025 2236 by Ben Cruz RN)  Free From Fall Injury:   Based on caregiver fall risk screen, instruct family/caregiver to ask for assistance with transferring infant if caregiver noted to have fall risk factors   Instruct family/caregiver on patient safety  Note: No falls were observed or reported so far this shift, gait steady when ambulating and wears non-skid slippers socks. Remains on fall precautions.    2/13/2025 1348 by Swati Ceron, RN  Note: No falls were observed or reported so far this shift, gait steady when ambulating and wears non-skid slippers socks. Remains on fall precautions.       Problem: Discharge Planning  Goal: Discharge to home or other facility with appropriate resources  2/14/2025 0036 by Deepa Millan LPN  Outcome: Progressing  Flowsheets (Taken 2/13/2025 1245 by Swati Ceron, RN)  Discharge to home or other facility with appropriate resources:   Identify barriers to discharge with patient and caregiver   Arrange  Progressing  2/13/2025 1348 by Swati Ceron, RN  Note: Reports left hip pain, rating the pain 3 out of 10 with 10 being the worse pain.      Problem: Coping  Goal: Pt/Family able to verbalize concerns and demonstrate effective coping strategies  Description: INTERVENTIONS:  1. Assist patient/family to identify coping skills, available support systems and cultural and spiritual values  2. Provide emotional support, including active listening and acknowledgement of concerns of patient and caregivers  3. Reduce environmental stimuli, as able  4. Instruct patient/family in relaxation techniques, as appropriate  5. Assess for spiritual pain/suffering and initiate Spiritual Care, Psychosocial Clinical Specialist consults as needed  2/14/2025 0036 by Deepa Millan LPN  Outcome: Progressing  Flowsheets (Taken 2/13/2025 1436 by Edgar Patel CTRS)  Patient/family able to verbalize anxieties, fears, and concerns, and demonstrate effective coping:   Assist patient/family to identify coping skills, available support systems and cultural and spiritual values   Provide emotional support, including active listening and acknowledgement of concerns of patient and caregivers  Note: Patient was enthused with how her day went today \" much better than yesterday, I liked the spiritual group and the topic regarding \" Should'a, would'a, could'a\". Patient was able to set a goal to keep taking meds and to write down a lists of goals she has for herself when she returns home.   2/13/2025 1436 by Edgar Patel CTRS  Outcome: Progressing  Flowsheets (Taken 2/13/2025 1436)  Patient/family able to verbalize anxieties, fears, and concerns, and demonstrate effective coping:   Assist patient/family to identify coping skills, available support systems and cultural and spiritual values   Provide emotional support, including active listening and acknowledgement of concerns of patient and caregivers  Note: Pt has attended 5/5 group

## 2025-02-14 NOTE — PROGRESS NOTES
Group Therapy Note    Date: 2/14/2025  Start Time: 0930  End Time:  1030  Number of Participants: 7    Type of Group: Psychotherapy    Notes:  Patient was present in group. Group members discussed the topic of willfulness versus willpower. Members identified areas of change needed and how to make improvement in their lives. Members discussed barriers to making the changes necessary for their recovery. Members discussed the importance of verbalizing words of action versus the action of making change and following through with tasks.  Members discussed how to continue forward within their recovery.     Status After Intervention:  Improved    Participation Level: Active Listener and Interactive    Participation Quality: Appropriate, Attentive, Sharing, and Supportive      Speech:  normal      Thought Process/Content: Logical  Linear      Affective Functioning: Congruent      Mood: euthymic      Level of consciousness:  Alert, Oriented x4, and Attentive      Response to Learning: Able to verbalize current knowledge/experience, Able to verbalize/acknowledge new learning, Able to retain information, Capable of insight, Able to change behavior, and Progressing to goal      Endings: None Reported    Modes of Intervention: Education, Support, Socialization, Exploration, Clarifying, and Problem-solving      Discipline Responsible: /Counselor      Signature:  ACACIA Blount

## 2025-02-15 VITALS
WEIGHT: 145 LBS | HEART RATE: 83 BPM | RESPIRATION RATE: 16 BRPM | OXYGEN SATURATION: 98 % | HEIGHT: 63 IN | TEMPERATURE: 97.7 F | BODY MASS INDEX: 25.69 KG/M2 | SYSTOLIC BLOOD PRESSURE: 102 MMHG | DIASTOLIC BLOOD PRESSURE: 74 MMHG

## 2025-02-15 PROCEDURE — 5130000000 HC BRIDGE APPOINTMENT

## 2025-02-15 PROCEDURE — 6370000000 HC RX 637 (ALT 250 FOR IP): Performed by: PSYCHIATRY & NEUROLOGY

## 2025-02-15 PROCEDURE — 99239 HOSP IP/OBS DSCHRG MGMT >30: CPT | Performed by: PSYCHIATRY & NEUROLOGY

## 2025-02-15 RX ORDER — ARIPIPRAZOLE 15 MG/1
15 TABLET ORAL DAILY
Qty: 30 TABLET | Refills: 0 | Status: SHIPPED | OUTPATIENT
Start: 2025-02-15

## 2025-02-15 RX ORDER — DOXEPIN HYDROCHLORIDE 25 MG/1
25 CAPSULE ORAL NIGHTLY
Qty: 30 CAPSULE | Refills: 0 | Status: SHIPPED | OUTPATIENT
Start: 2025-02-15

## 2025-02-15 RX ORDER — HYDROXYZINE HYDROCHLORIDE 50 MG/1
50 TABLET, FILM COATED ORAL 3 TIMES DAILY PRN
Qty: 30 TABLET | Refills: 0 | Status: SHIPPED | OUTPATIENT
Start: 2025-02-15 | End: 2025-02-25

## 2025-02-15 RX ADMIN — ARIPIPRAZOLE 15 MG: 15 TABLET ORAL at 07:57

## 2025-02-15 RX ADMIN — HYDROXYZINE HYDROCHLORIDE 50 MG: 25 TABLET, FILM COATED ORAL at 08:00

## 2025-02-15 ASSESSMENT — PAIN SCALES - GENERAL: PAINLEVEL_OUTOF10: 0

## 2025-02-15 ASSESSMENT — PAIN SCALES - WONG BAKER: WONGBAKER_NUMERICALRESPONSE: NO HURT

## 2025-02-15 NOTE — TRANSITION OF CARE
Behavioral Health Transition Record    Patient Name: Devora Davis  YOB: 1995   Medical Record Number: [id number]  Date of Admission: 2/11/2025  9:32 AM   Date of Discharge: 2/15/2025    Attending Provider: Sai Hayes,*   Discharging Provider: Dr. Hayes  To contact this individual call [phone] and ask the  to page.  If unavailable, ask to be transferred to Behavioral Health Provider on call.  A Behavioral Health Provider will be available on call 24/7 and during holidays.    Primary Care Provider: Ladarius Clark APRN - CNP    Allergies   Allergen Reactions    Amoxicillin     Benadryl [Diphenhydramine] Headaches       Reason for Admission: Delusions    Admission Diagnosis: Schizoaffective disorder, bipolar type (HCC) [F25.0]    * No surgery found *    No results found for this visit on 02/11/25.    Immunizations administered during this encounter:   Immunization History   Administered Date(s) Administered    DTaP vaccine [birth year], 02/02/1998, 09/18/1998, 02/03/2002, 07/03/2002    HPV Quadrivalent (Gardasil) 04/03/2014, 06/03/2014, 12/04/2014    Hep A, HAVRIX, VAQTA, (age 12m-18y), IM, 0.5mL 06/03/2014, 12/04/2014    Hep B, ENGERIX-B, RECOMBIVAX-HB, (age Birth - 19y), IM, 0.5mL 1995, 1995, 06/05/1998    Hib PRP-OMP, PEDVAXHIB, (age 2m-6y, Adlt Risk), IM, 0.5mL 1995, 1997, 02/02/1998, 09/18/1998    Influenza, FLUCELVAX, (age 6 mo+) IM, Trivalent PF, 0.5mL 10/10/2024    MMR, PRIORIX, M-M-R II, (age 12m+), SC, 0.5mL 06/05/1998, 07/03/2002, 06/20/2020    Meningococcal ACWY, MENACTRA (MenACWY-D), (age 9m-55y), IM, 0.5mL 04/03/2014    Poliovirus, IPOL, (age 6w+), SC/IM, 0.5mL 1997, 1997, 06/05/1998, 07/03/2002    TDaP, ADACEL (age 10y-64y), BOOSTRIX (age 10y+), IM, 0.5mL 05/14/2008, 04/03/2014, 06/20/2020    Varicella, VARIVAX, (age 12m+), SC, 0.5mL 06/05/1998, 05/14/2008     Influenza Vaccination Status: None of the

## 2025-02-15 NOTE — PLAN OF CARE
observed or reported so far this shift. Remains on every 15 minutes precautions for safety.  Patient denies suicidal ideations at present time       Problem: Depression  Goal: Will be euthymic at discharge  Description: INTERVENTIONS:  1. Administer medication as ordered  2. Provide emotional support via 1:1 interaction with staff  3. Encourage involvement in milieu/groups/activities  4. Monitor for social isolation  2/15/2025 0039 by Ben Cruz RN  Outcome: Progressing  Note: Patient is anticipatory of discharge and made improvement .    2/14/2025 1321 by Leslie Zhang RN  Outcome: Progressing  Note: Patient reports mood 6/10 with 10 being normal. Has flat affect but brightens with interaction. Speech clear. good eye contact. Reports has hope for future and identifies mom as their support system.  Patient denies depression at present time.       Problem: Sleep Disturbance  Goal: Will exhibit normal sleeping pattern  Description: INTERVENTIONS:  1. Administer medication as ordered  2. Decrease environmental stimuli, including noise, as appropriate  3. Discourage social isolation and naps during the day  2/15/2025 0039 by Ben Cruz RN  Outcome: Progressing  Note: Denies any of such .   2/14/2025 1321 by Leslie Zhang RN  Outcome: Progressing  Note: Patient slept 7 1/2 hours last night, reports they did slept well.  Encourage patient not to take naps during the day, relax several hours before bed and to take prescribed sleep meds as ordered. Patient  verbalized understanding.        Problem: Involuntary Admit  Goal: Will cooperate with staff recommendations and doctor's orders and will demonstrate appropriate behavior  Description: INTERVENTIONS:  1. Treat underlying conditions and offer medication as ordered  2. Educate regarding involuntary admission procedures and rules  3. Contain excessive/inappropriate behavior per unit and hospital policies  2/15/2025 0039 by Ben Cruz  CRISTIAN Reinoso  Outcome: Progressing  Flowsheets (Taken 2/15/2025 0039)  Will cooperate with staff recommendations and doctor's orders and will demonstrate appropriate behavior:   Contain excessive/inappropriate behavior per unit and hospital policies   Educate regarding involuntary admission procedures and rules   Treat underlying conditions and offer medication as ordered  Note: Patient is compliant with medication,treatment plan , and groups .   2/14/2025 1321 by Leslie Zhang RN  Outcome: Progressing  Note: Appropriate behavior on the unit, cooperates with plan of care     Problem: Spiritual Care  Goal: Pt/Family able to move forward in process of forgiving self, others, and/or higher power  Description: INTERVENTIONS:  1. Assist patient/family to overcome blocks to healing by use of spiritual practices (prayer, meditation, guided imagery, reiki, breath work, etc).  2. De-myth guilt and help patient/family identify possible irrational spiritual/cultural beliefs and values.  3. Explore possibilities of making amends & reconciliation with self, others, and/or a greater power.  4. Guide patient/family in identifying painful feelings of guilt.  5. Help patient/famiy explore and identify spiritual beliefs, cultural understandings or values that may help or hinder letting go of issue.  6. Help patient/family explore feelings of anger, bitterness, resentment.  7. Help patient/family identify and examine the situation in which these feelings are experienced.  8. Help patient/family identify destructive displacement of feelings onto other individuals.  9. Invite use of sacraments/rituals/ceremonies as appropriate (e.g. - confession, anointing, smudging).  10. Refer patient/family to formal counseling and/or to memo community for further support work.  2/15/2025 0039 by Ben Cruz, CRISTIAN  Outcome: Progressing  Flowsheets (Taken 2/15/2025 0039)  Patient/family able to move forward in process of forgiving self,
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
## 2025-02-15 NOTE — PROGRESS NOTES
Behavioral Health   Discharge Note    Pt discharged with followings belongings:   Dental Appliances: None  Vision - Corrective Lenses: Eyeglasses  Hearing Aid: None  Jewelry: Necklace  Body Piercings Removed: N/A  Clothing: Footwear, Shorts, Shirt  Other Valuables: Other (Comment) (none)   Valuables retrieved from safe, security envelope number:  n/a and returned to patient.  Patient left department with nurse via ambulatory and cab.  Discharged to mothers home. \"An Important Message from Medicare About Your Rights\" (IMM) form photocopy original from admission and provided to pt at least 4 hours prior to discharge n/a. \"An Important Message from [employer] About Your Rights\" (IMM) form photocopy original from admission. N/A. If pt left within 4 hours of receiving 2nd delivery of IMM, this is because pt was agreeable with hospital discharge.  Patient/guardian education on aftercare instructions: Yes  Bridge appointment completed:  yes.  Reviewed Discharge Instructions with patient/family/nursing facility.  Patient/family verbalizes understanding and agreement with the discharge plan using the teachback method.   Information faxed to n/a by n/a Patient/family verbalize understanding of AVS:Yes    Status EXAM upon discharge:  Mental Status and Behavioral Exam  Normal: No  Level of Assistance: Independent/Self  Facial Expression: Brightened, Worried  Affect: Appropriate  Level of Consciousness: Alert  Frequency of Checks: 4 times per hour, close  Mood:Normal: No  Mood: Anxious  Motor Activity:Normal: Yes  Eye Contact: Good  Observed Behavior: Cooperative  Sexual Misconduct History: Current - no  Preception: Telephone to person, Telephone to time, Telephone to place, Telephone to situation  Attention:Normal: No  Attention: Distractible  Thought Processes: Unremarkable  Thought Content:Normal: Yes  Thought Content: Paranoia  Depression Symptoms: No problems reported or observed.  Anxiety Symptoms: Generalized  Gabriela Symptoms:

## 2025-02-15 NOTE — DISCHARGE INSTRUCTIONS
Keep all follow-up appointments and take medications as directed.     Call the hope line if needed at :  [facility] [phone].  [facility] [phone].  [facility] [phone].  [facility] [phone].  [facility] [phone].  [facility] [phone]    Symptoms to report to your Doctor:  Depression  Inability to eat, sleep, or have a bowel movement  Increased sleepiness and lethargy  Voices in your head  Any thoughts of harming self or others    Things to avoid:  Caffeine  Alcohol  No street drugs  Over the counter medications unless Ok'd by your physician or pharmacist.  Driving or operating machinery until full effects of your medications are known.  Driving or operating machinery if dizzy or drowsy from medications.  Use journal as directed.  [facility] Hotline:  [phone]    Crisis phone numbers:  [facility] [phone].  [facility] [phone]  [facility] [phone].  [facility] [phone].  [facility] [phone].  [facility] [phone].    [facility] Professional Services  [address], [place] [address] [phone]    [facility] [place] [facility]  [address] [address]  [place], [place] [address] [phone]    [facility]  [address] [place] [address] [phone]    [facility]  [address] [place] [address] [phone]    [address] [address] [phone]    [facility] Professional Services  [address] [place], [place] [address] [phone]    [facility]

## 2025-02-17 ENCOUNTER — TELEPHONE (OUTPATIENT)
Dept: FAMILY MEDICINE CLINIC | Age: 30
End: 2025-02-17

## 2025-02-17 NOTE — TELEPHONE ENCOUNTER
Care Transitions Initial Follow Up Call    Outreach made within 2 business days of discharge: Yes    Patient: Devora Davis Patient : 1995   MRN: [id number]  Reason for Admission: Schizoaffective disorder  Discharge Date: 2/15/25       Spoke with: patient    Discharge department/facility: [facility] Interactive Patient Contact:  Was patient able to fill all prescriptions: Yes  Was patient instructed to bring all medications to the follow-up visit: Yes  Is patient taking all medications as directed in the discharge summary? Yes  Does patient understand their discharge instructions: Yes  Does patient have questions or concerns that need addressed prior to 7-14 day follow up office visit: no    Additional needs identified to be addressed with provider  No needs identified             Scheduled appointment with PCP within 7-14 days    Follow Up  No future appointments.    Kay Fung, CMA

## 2025-02-20 ENCOUNTER — TELEPHONE (OUTPATIENT)
Age: 30
End: 2025-02-20

## 2025-02-20 ENCOUNTER — OFFICE VISIT (OUTPATIENT)
Dept: FAMILY MEDICINE CLINIC | Age: 30
End: 2025-02-20
Payer: MEDICAID

## 2025-02-20 VITALS
BODY MASS INDEX: 27 KG/M2 | DIASTOLIC BLOOD PRESSURE: 78 MMHG | SYSTOLIC BLOOD PRESSURE: 100 MMHG | WEIGHT: 150 LBS | HEART RATE: 82 BPM | RESPIRATION RATE: 16 BRPM | TEMPERATURE: 97.7 F

## 2025-02-20 DIAGNOSIS — F90.9 ATTENTION DEFICIT HYPERACTIVITY DISORDER (ADHD), UNSPECIFIED ADHD TYPE: Primary | ICD-10-CM

## 2025-02-20 DIAGNOSIS — G47.411 NARCOLEPSY WITH CATAPLEXY: ICD-10-CM

## 2025-02-20 DIAGNOSIS — Q79.60 EHLERS-DANLOS SYNDROME: ICD-10-CM

## 2025-02-20 PROCEDURE — 99213 OFFICE O/P EST LOW 20 MIN: CPT | Performed by: NURSE PRACTITIONER

## 2025-02-20 RX ORDER — DEXTROAMPHETAMINE SACCHARATE, AMPHETAMINE ASPARTATE MONOHYDRATE, DEXTROAMPHETAMINE SULFATE AND AMPHETAMINE SULFATE 3.75; 3.75; 3.75; 3.75 MG/1; MG/1; MG/1; MG/1
15 CAPSULE, EXTENDED RELEASE ORAL EVERY MORNING
COMMUNITY
Start: 2025-02-17

## 2025-02-20 ASSESSMENT — ENCOUNTER SYMPTOMS
COUGH: 0
WHEEZING: 0
BACK PAIN: 0
SORE THROAT: 0
ALLERGIC/IMMUNOLOGIC NEGATIVE: 1
NAUSEA: 0
TROUBLE SWALLOWING: 0
DIARRHEA: 0
ABDOMINAL PAIN: 0
VOMITING: 0
RHINORRHEA: 0
EYE PAIN: 0
CONSTIPATION: 0
EYE REDNESS: 0
SHORTNESS OF BREATH: 0
EYE DISCHARGE: 0

## 2025-02-20 NOTE — PROGRESS NOTES
vomiting.   Endocrine: Negative.    Genitourinary:  Negative for dysuria, frequency and urgency.   Musculoskeletal:  Negative for arthralgias, back pain and myalgias.   Skin:  Negative for rash.   Allergic/Immunologic: Negative.    Neurological:  Negative for dizziness, tremors, weakness and headaches.   Hematological: Negative.    Psychiatric/Behavioral:  Negative for dysphoric mood and sleep disturbance. The patient is not nervous/anxious.        Objective:     /78   Pulse 82   Temp 97.7 °F (36.5 °C) (Oral)   Resp 16   Wt 68 kg (150 lb)   LMP 02/18/2025 (Exact Date)   BMI 27.00 kg/m²     Physical Exam  Vitals and nursing note reviewed.   Constitutional:       General: She is not in acute distress.     Appearance: She is well-developed. She is not ill-appearing or diaphoretic.   HENT:      Right Ear: External ear normal.      Left Ear: External ear normal.      Nose: Nose normal.   Eyes:      General:         Right eye: No discharge.         Left eye: No discharge.      Conjunctiva/sclera: Conjunctivae normal.      Pupils: Pupils are equal, round, and reactive to light.   Neck:      Vascular: No JVD.   Cardiovascular:      Rate and Rhythm: Normal rate and regular rhythm.   Pulmonary:      Effort: Pulmonary effort is normal. No respiratory distress.   Musculoskeletal:         General: No tenderness or deformity. Normal range of motion.      Cervical back: Normal range of motion.   Skin:     General: Skin is warm and dry.      Capillary Refill: Capillary refill takes less than 2 seconds.      Coloration: Skin is not pale.      Findings: No erythema or rash.   Neurological:      Mental Status: She is alert and oriented to person, place, and time.      Coordination: Coordination normal.   Psychiatric:         Behavior: Behavior normal.         Thought Content: Thought content normal.         Judgment: Judgment normal.         Assessment/Plan:      Devora was seen today for follow-up from hospital and

## 2025-02-20 NOTE — TELEPHONE ENCOUNTER
Follow up call to patient to evaluate if they had questions related to the recent stay or discharge.  Patient did not answer at the number provided but a voicemail was left with contact information.

## 2025-03-11 ENCOUNTER — LAB (OUTPATIENT)
Dept: LAB | Age: 30
End: 2025-03-11

## 2025-03-20 LAB — CYTOLOGY THIN PREP PAP: NORMAL

## 2025-04-10 ENCOUNTER — OFFICE VISIT (OUTPATIENT)
Dept: FAMILY MEDICINE CLINIC | Age: 30
End: 2025-04-10
Payer: MEDICAID

## 2025-04-10 VITALS
BODY MASS INDEX: 28.06 KG/M2 | WEIGHT: 155.9 LBS | SYSTOLIC BLOOD PRESSURE: 102 MMHG | HEART RATE: 75 BPM | OXYGEN SATURATION: 100 % | TEMPERATURE: 98 F | DIASTOLIC BLOOD PRESSURE: 66 MMHG

## 2025-04-10 DIAGNOSIS — R61 HYPERHIDROSIS: Primary | ICD-10-CM

## 2025-04-10 DIAGNOSIS — N89.8 VAGINAL DISCHARGE: ICD-10-CM

## 2025-04-10 DIAGNOSIS — B37.0 ORAL THRUSH: ICD-10-CM

## 2025-04-10 DIAGNOSIS — G47.411 CATAPLEXY: ICD-10-CM

## 2025-04-10 DIAGNOSIS — F17.200 SMOKER: ICD-10-CM

## 2025-04-10 DIAGNOSIS — F42.2 MIXED OBSESSIONAL THOUGHTS AND ACTS: ICD-10-CM

## 2025-04-10 PROBLEM — F30.9 MANIC EPISODE, UNSPECIFIED: Status: ACTIVE | Noted: 2025-02-10

## 2025-04-10 PROCEDURE — 99214 OFFICE O/P EST MOD 30 MIN: CPT | Performed by: NURSE PRACTITIONER

## 2025-04-10 RX ORDER — GLYCOPYRROLATE 1 MG/1
1 TABLET ORAL 3 TIMES DAILY
Qty: 90 TABLET | Refills: 3 | Status: CANCELLED | OUTPATIENT
Start: 2025-04-10

## 2025-04-10 RX ORDER — FLUCONAZOLE 150 MG/1
150 TABLET ORAL DAILY
Qty: 3 TABLET | Refills: 0 | Status: SHIPPED | OUTPATIENT
Start: 2025-04-10 | End: 2025-04-13

## 2025-04-10 ASSESSMENT — ENCOUNTER SYMPTOMS
BACK PAIN: 0
CONSTIPATION: 0
VOMITING: 0
WHEEZING: 0
RHINORRHEA: 0
DIARRHEA: 0
ABDOMINAL PAIN: 0
COUGH: 0
EYE REDNESS: 0
TROUBLE SWALLOWING: 0
NAUSEA: 0
EYE DISCHARGE: 0
SORE THROAT: 0
EYE PAIN: 0
ALLERGIC/IMMUNOLOGIC NEGATIVE: 1
SHORTNESS OF BREATH: 0

## 2025-04-10 NOTE — PROGRESS NOTES
[facility] PROFESSIONAL SERVS  [facility]  [address] [address]  Dept: [phone]  Dept Fax: [phone]  Loc: [phone]     Visit Date:  4/10/2025      Patient:  Devora Davis  YOB: 1995    HPI:     Chief Complaint   Patient presents with    Thrush     Patient has bad thrush, has hx of having this yearly. Patient had labwork that she had BV and they did not treat her for this, unsure if she should be     Excessive Sweating     Patient has skin tags in the same spot her aunt does who also has hyper hydrosis, has always dealt with excessive sweating. 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    Excessive sweating is reported, particularly in the scalp and groin areas, which intensifies with physical exertion. This condition is present daily and is not associated with any rash or irritation. Excessive perspiration during sleep necessitates morning and evening showers to manage body odor. Additionally, hands and feet become sweaty when nervous, although without any accompanying odor. No medical treatment has been sought for this issue, nor has clinical strength deodorant been attempted. A familial link to this condition is suspected as her cousin exhibits similar symptoms.    A diagnosis of bacterial vaginosis (BV) was previously made by an OB/GYN specialist at [facility], following a positive swab test. Complaints included abnormal vaginal discharge and an unusual odor. However, test results were not communicated, and no treatment was received. A history of BV is noted, and she is currently undergoing her menstrual cycle. Regular annual Pap smear screenings are maintained.    A small skin tag on the groin is noted, and information regarding medical treatment options is sought.    A history of bipolar disorder, schizoaffective disorder,

## 2025-04-11 ENCOUNTER — RESULTS FOLLOW-UP (OUTPATIENT)
Dept: FAMILY MEDICINE CLINIC | Age: 30
End: 2025-04-11

## 2025-04-13 LAB
BACTERIA GENITAL AEROBE CULT: NORMAL
GRAM STN GENITAL: NORMAL

## 2025-04-17 ENCOUNTER — OFFICE VISIT (OUTPATIENT)
Dept: ALLERGY | Age: 30
End: 2025-04-17
Payer: MEDICAID

## 2025-04-17 VITALS
SYSTOLIC BLOOD PRESSURE: 117 MMHG | WEIGHT: 150 LBS | OXYGEN SATURATION: 98 % | BODY MASS INDEX: 27.6 KG/M2 | HEART RATE: 86 BPM | HEIGHT: 62 IN | RESPIRATION RATE: 18 BRPM | DIASTOLIC BLOOD PRESSURE: 79 MMHG

## 2025-04-17 DIAGNOSIS — Z91.09 POLLEN ALLERGIES: ICD-10-CM

## 2025-04-17 DIAGNOSIS — J30.89 NON-SEASONAL ALLERGIC RHINITIS, UNSPECIFIED TRIGGER: ICD-10-CM

## 2025-04-17 DIAGNOSIS — Z91.018 FOOD ALLERGY: ICD-10-CM

## 2025-04-17 DIAGNOSIS — J30.1 NON-SEASONAL ALLERGIC RHINITIS DUE TO POLLEN: ICD-10-CM

## 2025-04-17 DIAGNOSIS — R21 RASH: Primary | ICD-10-CM

## 2025-04-17 DIAGNOSIS — Z91.048 ALLERGY TO MOLD: ICD-10-CM

## 2025-04-17 PROCEDURE — 99204 OFFICE O/P NEW MOD 45 MIN: CPT | Performed by: NURSE PRACTITIONER

## 2025-04-17 RX ORDER — ARIPIPRAZOLE 20 MG/1
20 TABLET ORAL DAILY
COMMUNITY
Start: 2025-04-16

## 2025-04-17 RX ORDER — FLUTICASONE PROPIONATE 50 MCG
2 SPRAY, SUSPENSION (ML) NASAL DAILY PRN
Qty: 1 EACH | Refills: 2 | Status: SHIPPED | OUTPATIENT
Start: 2025-04-17

## 2025-04-17 RX ORDER — FLUOXETINE 10 MG/1
10 CAPSULE ORAL DAILY
COMMUNITY
Start: 2025-04-16

## 2025-04-17 RX ORDER — GABAPENTIN 300 MG/1
300 CAPSULE ORAL 3 TIMES DAILY
COMMUNITY
Start: 2025-04-16

## 2025-04-17 RX ORDER — BUPROPION HYDROCHLORIDE 300 MG/1
300 TABLET ORAL EVERY MORNING
COMMUNITY

## 2025-04-17 RX ORDER — DEXTROAMPHETAMINE SACCHARATE, AMPHETAMINE ASPARTATE, DEXTROAMPHETAMINE SULFATE AND AMPHETAMINE SULFATE 2.5; 2.5; 2.5; 2.5 MG/1; MG/1; MG/1; MG/1
10 TABLET ORAL DAILY
COMMUNITY
Start: 2025-04-16

## 2025-04-17 RX ORDER — HYDROXYZINE PAMOATE 50 MG/1
50 CAPSULE ORAL 3 TIMES DAILY PRN
COMMUNITY
Start: 2025-04-16 | End: 2025-04-17

## 2025-04-17 ASSESSMENT — ENCOUNTER SYMPTOMS: RHINORRHEA: 1

## 2025-04-17 NOTE — PATIENT INSTRUCTIONS
GET LABS COMPLETED ASAP PLEASE AND AT LEAST 2 WEEKS PRIOR TO ALLERGY TESTING. LABS ARE NON-FASTING.    PATIENT TO RETURN FOR ALLERGY TESTING    STOP THE FOLLOWING MEDICATIONS (IF TAKING) ONE WEEK PRIOR TO ALLERGY TESTIN. ANTIHISTAMINES - ZYRTEC (CETIRIZINE), CLARITIN (LORATADINE), XYZAL (LEVOCETIRIZINE), BENADRYL (DIPHENHYDRAMINE), HYDROXYZINE, VISTARIL, ALLEGRA (FEXOFENADINE), DOXYALAMINE  2. STOP NASAL SPRAYS/RINSES - NASOCORT, FLONASE, NASONEX, ASTELIN, ANTIVERT (MECLIZINE),BUDESONIDE, XHANCE, RYALTRIS  3. STOP STOMACH MEDICATIONS - PEPCID, FAMOTIDINE, ZANTAC, RANITIDINE  4. STOP SINGULAIR (MONTELUKAST)  5. IF POSSIBLE HOLD MAINTENANCE INHALERS THE DAY OF TESTING BUT BRING WITH YOU TO USE AFTER THE ALLERGY TESTING. YOU CAN USE YOUR RESCUE INHALER - ALBUTEROL AS DIRECTED. IF YOU HAVE A HARD TIME BREATHING PLEASE USE ALL INHALERS AS DIRECTED.  WE DO NOT WANT YOU TO HAVE DIFFICULTY BREATHING!  6. ELAVIL (AMITRIPTYLINE) - MUST DISCUSS WITH FAVIAN ALEXANDER OR PCP PRIOR TO HOLDING    FOLLOWING ALLERGY TESTING YOU MAY RESUME YOUR ALLERGY AND/OR MAINTENANCE ASTHMA MEDICATIONS    IF YOU ARE PLACED ON ANY NEW MEDICATIONS (INCLUDING STEROIDS) BETWEEN NOW AND THE TIME OF YOUR ALLERGY TESTING BY ANY OTHER PROVIDER CALL THE OFFICE TO VERIFY IF THIS WILL INTERFERE WITH ALLERGY TESTING    PLEASE SHOWER THE MORNING OF ALLERGY TESTING.  THIS IS VERY IMPORTANT FOR TESTING AND INFECTION CONTROL PRIOR TO SKIN ALLERGY TESTING    IF ANY LABS, RADIOLOGY EXMAS, OR PULMONARY TESTS ARE ORDERED, YOU WILL BE NOTIFIED OF ANY ABNORMAL RESULTS IF WE ARE CONCERED.  OTHERWISE LABS WILL BE REVIEWED AT YOUR NEXT VISIT.    How To Prepare For Your Allergy Test    As you embark on your allergy testing, there are several important things to remember.  Your testing process may take 60-90 minutes, but will result in information on which allergens you react to and how severely you react to each one.  Be sure and inform your provider and staff if you

## 2025-04-17 NOTE — PROGRESS NOTES
tremors.    ALLERGIES  The patient has had an allergic reaction to LATEX CONDOMS and KIWI.    MEDICATIONS  Current: Zyrtec, Flonase nasal spray, Wellbutrin, Prozac, Adderall.  Discontinued: Hydroxyzine.     History of Present Illness               Review of Systems:  Review of Systems   HENT:  Positive for congestion and rhinorrhea.    Allergic/Immunologic: Positive for environmental allergies and food allergies.   All other systems reviewed and are negative.      Past Medical History:    Past Medical History:   Diagnosis Date    ADHD (attention deficit hyperactivity disorder)     When i was 9yr old prescribed to adderall       Past Surgical History:    Past Surgical History:   Procedure Laterality Date    DILATION AND CURETTAGE OF UTERUS  2019    unknow doctor but had in [facility]    FRACTURE SURGERY Bilateral     rods in left elbow and due to fracture and had cast to the right elbow due to fracture       Family History:   Family History   Problem Relation Age of Onset    Other Maternal Cousin         neiurological disorder where cannot move and maybe spina bifada       Social History:   Social History     Tobacco Use    Smoking status: Every Day     Current packs/day: 0.50     Average packs/day: 0.5 packs/day for 14.3 years (7.1 ttl pk-yrs)     Types: Cigarettes, Cigars     Start date: 1/1/2011     Passive exposure: Current    Smokeless tobacco: Never   Substance Use Topics    Alcohol use: Not Currently        Allergies:    Allergies   Allergen Reactions    Amoxicillin     Benadryl [Diphenhydramine] Headaches       Current Medications:   Prior to Visit Medications    Medication Sig Taking? Authorizing Provider   amphetamine-dextroamphetamine (ADDERALL) 10 MG tablet Take 1 tablet by mouth daily. Yes ProviderKhoi MD   ARIPiprazole (ABILIFY) 20 MG tablet Take 1 tablet by mouth daily Yes ProviderKhoi MD   gabapentin (NEURONTIN) 300 MG capsule Take 1 capsule by mouth 3 times daily. Yes Provider

## 2025-04-22 ENCOUNTER — LAB (OUTPATIENT)
Dept: LAB | Age: 30
End: 2025-04-22

## 2025-04-22 DIAGNOSIS — J30.1 NON-SEASONAL ALLERGIC RHINITIS DUE TO POLLEN: ICD-10-CM

## 2025-04-22 DIAGNOSIS — Z91.018 FOOD ALLERGY: ICD-10-CM

## 2025-04-22 DIAGNOSIS — R21 RASH: ICD-10-CM

## 2025-04-22 DIAGNOSIS — J30.89 NON-SEASONAL ALLERGIC RHINITIS, UNSPECIFIED TRIGGER: ICD-10-CM

## 2025-04-22 LAB
BASOPHILS ABSOLUTE: 0.1 THOU/MM3 (ref 0–0.1)
BASOPHILS NFR BLD AUTO: 0.9 %
DEPRECATED RDW RBC AUTO: 38 FL (ref 35–45)
EOSINOPHIL NFR BLD AUTO: 7.7 %
EOSINOPHILS ABSOLUTE: 0.8 THOU/MM3 (ref 0–0.4)
ERYTHROCYTE [DISTWIDTH] IN BLOOD BY AUTOMATED COUNT: 12.2 % (ref 11.5–14.5)
HCT VFR BLD AUTO: 41.4 % (ref 37–47)
HGB BLD-MCNC: 13.8 GM/DL (ref 12–16)
IMM GRANULOCYTES # BLD AUTO: 0.03 THOU/MM3 (ref 0–0.07)
IMM GRANULOCYTES NFR BLD AUTO: 0.3 %
LYMPHOCYTES ABSOLUTE: 2.2 THOU/MM3 (ref 1–4.8)
LYMPHOCYTES NFR BLD AUTO: 21.1 %
MCH RBC QN AUTO: 28.7 PG (ref 26–33)
MCHC RBC AUTO-ENTMCNC: 33.3 GM/DL (ref 32.2–35.5)
MCV RBC AUTO: 86.1 FL (ref 81–99)
MONOCYTES ABSOLUTE: 0.5 THOU/MM3 (ref 0.4–1.3)
MONOCYTES NFR BLD AUTO: 5 %
NEUTROPHILS ABSOLUTE: 6.6 THOU/MM3 (ref 1.8–7.7)
NEUTROPHILS NFR BLD AUTO: 65 %
NRBC BLD AUTO-RTO: 0 /100 WBC
PLATELET # BLD AUTO: 146 THOU/MM3 (ref 130–400)
PMV BLD AUTO: 12 FL (ref 9.4–12.4)
RBC # BLD AUTO: 4.81 MILL/MM3 (ref 4.2–5.4)
WBC # BLD AUTO: 10.2 THOU/MM3 (ref 4.8–10.8)

## 2025-04-23 ENCOUNTER — OFFICE VISIT (OUTPATIENT)
Dept: FAMILY MEDICINE CLINIC | Age: 30
End: 2025-04-23
Payer: MEDICAID

## 2025-04-23 ENCOUNTER — TELEPHONE (OUTPATIENT)
Dept: FAMILY MEDICINE CLINIC | Age: 30
End: 2025-04-23

## 2025-04-23 VITALS
WEIGHT: 150.7 LBS | OXYGEN SATURATION: 100 % | HEART RATE: 108 BPM | SYSTOLIC BLOOD PRESSURE: 114 MMHG | BODY MASS INDEX: 27.73 KG/M2 | HEIGHT: 62 IN | RESPIRATION RATE: 18 BRPM | DIASTOLIC BLOOD PRESSURE: 62 MMHG

## 2025-04-23 DIAGNOSIS — L91.8 SKIN TAG: ICD-10-CM

## 2025-04-23 DIAGNOSIS — B37.0 ORAL THRUSH: ICD-10-CM

## 2025-04-23 DIAGNOSIS — R61 HYPERHIDROSIS: ICD-10-CM

## 2025-04-23 DIAGNOSIS — B80 PINWORMS: Primary | ICD-10-CM

## 2025-04-23 LAB
IGA SERPL-MCNC: 293 MG/DL (ref 70–400)
IGG SERPL-MCNC: 1203 MG/DL (ref 700–1600)
IGM SERPL-MCNC: 300 MG/DL (ref 40–230)

## 2025-04-23 PROCEDURE — 99214 OFFICE O/P EST MOD 30 MIN: CPT | Performed by: NURSE PRACTITIONER

## 2025-04-23 RX ORDER — FLUCONAZOLE 100 MG/1
100 TABLET ORAL DAILY
Qty: 7 TABLET | Refills: 0 | Status: SHIPPED | OUTPATIENT
Start: 2025-04-23 | End: 2025-04-30

## 2025-04-23 RX ORDER — ALBENDAZOLE 200 MG/1
400 TABLET, FILM COATED ORAL DAILY
Qty: 7 TABLET | Refills: 0 | Status: SHIPPED | OUTPATIENT
Start: 2025-04-23 | End: 2025-04-30

## 2025-04-23 ASSESSMENT — ENCOUNTER SYMPTOMS
ABDOMINAL PAIN: 0
EYE REDNESS: 0
EYE PAIN: 0
SORE THROAT: 0
WHEEZING: 0
BACK PAIN: 0
NAUSEA: 0
RHINORRHEA: 0
COUGH: 0
TROUBLE SWALLOWING: 0
ALLERGIC/IMMUNOLOGIC NEGATIVE: 1
DIARRHEA: 0
SHORTNESS OF BREATH: 0
CONSTIPATION: 0
EYE DISCHARGE: 0
VOMITING: 0

## 2025-04-23 NOTE — PROGRESS NOTES
external solution, Apply topically nightly., Disp: 60 mL, Rfl: 2    fluconazole (DIFLUCAN) 100 MG tablet, Take 1 tablet by mouth daily for 7 days, Disp: 7 tablet, Rfl: 0    amphetamine-dextroamphetamine (ADDERALL) 10 MG tablet, Take 1 tablet by mouth daily., Disp: , Rfl:     ARIPiprazole (ABILIFY) 20 MG tablet, Take 1 tablet by mouth daily, Disp: , Rfl:     gabapentin (NEURONTIN) 300 MG capsule, Take 1 capsule by mouth 3 times daily., Disp: , Rfl:     FLUoxetine (PROZAC) 20 MG capsule, Take 1 capsule by mouth daily, Disp: , Rfl:     fluticasone (FLONASE) 50 MCG/ACT nasal spray, 2 sprays by Each Nostril route daily as needed for Rhinitis or Allergies, Disp: 1 each, Rfl: 2    amphetamine-dextroamphetamine (ADDERALL XR) 15 MG extended release capsule, Take 1 capsule by mouth every morning., Disp: , Rfl:     cetirizine (ZYRTEC) 10 MG tablet, Take 1 tablet by mouth daily, Disp: 60 tablet, Rfl: 2    PARAGARD INTRAUTERINE COPPER IU, Paragard Intrauterine Copper, Disp: , Rfl:     The patient is allergic to amoxicillin and benadryl [diphenhydramine].    Past Medical History  Devora  has a past medical history of ADHD (attention deficit hyperactivity disorder).    Subjective:      Review of Systems   Constitutional:  Negative for activity change, fatigue and fever.   HENT:  Negative for congestion, ear pain, rhinorrhea, sore throat and trouble swallowing.    Eyes:  Negative for pain, discharge and redness.   Respiratory:  Negative for cough, shortness of breath and wheezing.    Cardiovascular: Negative.    Gastrointestinal:  Negative for abdominal pain, constipation, diarrhea, nausea and vomiting.        Rectal itching at night   Endocrine: Negative.    Genitourinary:  Negative for dysuria, frequency and urgency.   Musculoskeletal:  Negative for arthralgias, back pain and myalgias.   Skin:  Negative for rash.        Skin tag right axilla   Allergic/Immunologic: Negative.    Neurological:  Negative for dizziness, tremors,

## 2025-04-24 LAB — ALLERGEN GLUTEN IGE: < 0.1 KU/L (ref 0–0.34)

## 2025-04-25 LAB
ANNOTATION COMMENT IMP: NORMAL
BARLEY IGE QN: < 0.1 KU/L
BEEF IGE QN: < 0.1 KU/L
CABBAGE IGE QN: < 0.1 KU/L
CARROT IGE QN: < 0.1 KU/L
CHICKEN SERUM PROT IGE QN: < 0.1 KU/L
CODFISH IGE QN: < 0.1 KU/L
CORN IGE QN: < 0.1 KU/L
COW MILK IGE QN: < 0.1 KU/L
CRAB IGE QN: < 0.1 KU/L
EGG WHITE IGE QN: < 0.1 KU/L
GRAPE IGE QN: < 0.1 KU/L
LETTUCE IGE QN: < 0.1 KU/L
LTX IGE QN: NORMAL
MISC. #1 REFERENCE GROUP TEST: NORMAL
MISC. #2 REFERENCE REPORT: NORMAL
OAT IGE QN: < 0.1 KU/L
ORANGE IGE QN: < 0.1 KU/L
PAPRIKA IGE QN: < 0.1 KU/L
PORK IGE QN: < 0.1 KU/L
POTATO IGE QN: < 0.1 KU/L
RICE IGE QN: < 0.1 KU/L
RYE IGE QN: < 0.1 KU/L
SHRIMP IGE QN: < 0.1 KU/L
SOYBEAN IGE QN: < 0.1 KU/L
TOMATO IGE QN: < 0.1 KU/L
TUNA IGE QN: < 0.1 KU/L
WHEAT IGE QN: < 0.1 KU/L
WHITE BEAN IGE QN: < 0.1 KU/L

## 2025-04-26 LAB — STRONGYLOIDES IGG SER IA-ACNC: NORMAL

## 2025-04-28 ENCOUNTER — RESULTS FOLLOW-UP (OUTPATIENT)
Dept: ALLERGY | Age: 30
End: 2025-04-28

## 2025-04-28 DIAGNOSIS — B78.9 STRONGYLOIDIASIS: Primary | ICD-10-CM

## 2025-05-15 ENCOUNTER — HOSPITAL ENCOUNTER (OUTPATIENT)
Age: 30
Discharge: HOME | End: 2025-05-15
Payer: MEDICAID

## 2025-05-15 ENCOUNTER — OFFICE VISIT (OUTPATIENT)
Dept: INTERNAL MEDICINE CLINIC | Age: 30
End: 2025-05-15
Payer: MEDICAID

## 2025-05-15 VITALS
HEIGHT: 62 IN | WEIGHT: 151.4 LBS | OXYGEN SATURATION: 100 % | SYSTOLIC BLOOD PRESSURE: 116 MMHG | DIASTOLIC BLOOD PRESSURE: 72 MMHG | BODY MASS INDEX: 27.86 KG/M2 | HEART RATE: 88 BPM | TEMPERATURE: 97.7 F

## 2025-05-15 DIAGNOSIS — Q79.62 HYPERMOBILE EHLERS-DANLOS SYNDROME: Primary | ICD-10-CM

## 2025-05-15 DIAGNOSIS — Q79.62 HYPERMOBILE EHLERS-DANLOS SYNDROME: ICD-10-CM

## 2025-05-15 DIAGNOSIS — M24.9 HYPERMOBILITY OF JOINT: ICD-10-CM

## 2025-05-15 LAB — FOLATE SERPL-MCNC: 9.5 NG/ML (ref 4.6–34.8)

## 2025-05-15 PROCEDURE — 99202 OFFICE O/P NEW SF 15 MIN: CPT

## 2025-05-15 PROCEDURE — 36415 COLL VENOUS BLD VENIPUNCTURE: CPT

## 2025-05-15 PROCEDURE — 82746 ASSAY OF FOLIC ACID SERUM: CPT

## 2025-05-15 NOTE — PROGRESS NOTES
1995    No chief complaint on file.      Pt is a 30 y.o. female who presents for an initial evaluation of suspected Chrissy Danlos Syndrome. She is a patient of NAFISA Nugent. Patient states she has had pain in all limbs as a child. 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 She reports no dislocations as a child but reported subluxations of her hips. She states that she landed on her spine as a child while cheerleading and attempting a flip when she was roughly 11 years ol. She did not report to the hospital or any other facility for evaluation of any injuries after the incident. Patient reports numbness tingling in both her feet up to the ankle, as well as both hands starting at the fingertips and extending to the wrist. She states she has pain mostly in her hands up to the wrist, hips and around the knee joints.She has a recent history of narcoplexy with cataplexy where she reports she was diagnosed within the past year. She has been taking Adderall with improvement, she follows up with Psychiatry. She notes that she notices she develops cataplexy after reduced sleep such as not getting adequate sleep for 2 or more nights. She states she did notice stretchy skin as a child, but did not think it was abnormal at the time. She has a history of right elbow ORIF at age 11 with atrophic scarring noted at incision site. No reported relevant family history. No reported recent headaches. No recent traumas or injuries. Patient has one 5 year old child. She reports she urinates herself specifically when she coughs and does not note any other triggers. She states this urinary incontinence was present prior to pregnancy, but was not as severe.     Diagnostic Criteria for Hypermobile Chrissy-Danlos Syndrome (hEDS)  Criterion 1 - Generalized Joint Hypermobility     - Pubertal person age 50 and younger                        The image illustrates proper positioning for measuring the

## 2025-05-15 NOTE — PATIENT INSTRUCTIONS
- Obtain 5-methyl folate prior to next appointment  - Follow-up in 2 weeks  - Start taking 5-methyl folate after obtaining blood test

## 2025-05-20 ENCOUNTER — OFFICE VISIT (OUTPATIENT)
Dept: FAMILY MEDICINE CLINIC | Age: 30
End: 2025-05-20
Payer: MEDICAID

## 2025-05-20 VITALS
HEIGHT: 62 IN | DIASTOLIC BLOOD PRESSURE: 62 MMHG | BODY MASS INDEX: 27.16 KG/M2 | WEIGHT: 147.6 LBS | RESPIRATION RATE: 12 BRPM | HEART RATE: 72 BPM | SYSTOLIC BLOOD PRESSURE: 110 MMHG

## 2025-05-20 DIAGNOSIS — M62.89 PELVIC FLOOR DYSFUNCTION IN FEMALE: Primary | ICD-10-CM

## 2025-05-20 DIAGNOSIS — J30.89 NON-SEASONAL ALLERGIC RHINITIS, UNSPECIFIED TRIGGER: ICD-10-CM

## 2025-05-20 PROCEDURE — 99214 OFFICE O/P EST MOD 30 MIN: CPT | Performed by: NURSE PRACTITIONER

## 2025-05-20 RX ORDER — DEXTROAMPHETAMINE SACCHARATE, AMPHETAMINE ASPARTATE, DEXTROAMPHETAMINE SULFATE AND AMPHETAMINE SULFATE 2.5; 2.5; 2.5; 2.5 MG/1; MG/1; MG/1; MG/1
10 TABLET ORAL DAILY
Qty: 30 TABLET | Refills: 0 | Status: CANCELLED | OUTPATIENT
Start: 2025-05-20 | End: 2025-06-19

## 2025-05-20 RX ORDER — FLUTICASONE PROPIONATE 50 MCG
2 SPRAY, SUSPENSION (ML) NASAL DAILY PRN
Qty: 1 EACH | Refills: 2 | Status: SHIPPED | OUTPATIENT
Start: 2025-05-20

## 2025-05-20 RX ORDER — ARIPIPRAZOLE 20 MG/1
20 TABLET ORAL DAILY
Qty: 30 TABLET | Refills: 0 | Status: CANCELLED | OUTPATIENT
Start: 2025-05-20

## 2025-05-20 RX ORDER — DEXTROAMPHETAMINE SACCHARATE, AMPHETAMINE ASPARTATE MONOHYDRATE, DEXTROAMPHETAMINE SULFATE AND AMPHETAMINE SULFATE 3.75; 3.75; 3.75; 3.75 MG/1; MG/1; MG/1; MG/1
15 CAPSULE, EXTENDED RELEASE ORAL EVERY MORNING
Qty: 30 CAPSULE | Refills: 0 | Status: CANCELLED | OUTPATIENT
Start: 2025-05-20 | End: 2025-06-19

## 2025-05-20 ASSESSMENT — ENCOUNTER SYMPTOMS
RHINORRHEA: 0
VOMITING: 0
EYE PAIN: 0
TROUBLE SWALLOWING: 0
ABDOMINAL PAIN: 0
WHEEZING: 0
EYE DISCHARGE: 0
SORE THROAT: 0
EYE REDNESS: 0
DIARRHEA: 0
CONSTIPATION: 0
ALLERGIC/IMMUNOLOGIC NEGATIVE: 1
NAUSEA: 0
BACK PAIN: 0
SHORTNESS OF BREATH: 0
COUGH: 0

## 2025-05-20 NOTE — PROGRESS NOTES
[facility] PROFESSIONAL SERVS  [place] [address] [place] [id number]  Dept: [phone]  Dept Fax: [phone]  Loc: [phone]     Visit Date:  5/20/2025      Patient:  Devora Davis  YOB: 1995    HPI:     Chief Complaint   Patient presents with    3 Month Follow-Up     Patient states the allergist asked for a stool sample but patient is unsure as to why. Patient would like clarification.    Medication Refill       HPI  History of Present Illness  The patient presents for evaluation of Chrissy-Danlos syndrome, urinary incontinence, and medication management.    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 She has been experiencing urinary incontinence for an extended period, which has progressively worsened since the birth of her child. She reports no current dyspareunia, although she experienced it in her youth. She also reports a sensation of concavity when she spreads her legs widely but does not report any vaginal bulging. She has a history of one childbirth. Physical therapy for her pelvic floor has been recommended to manage this condition.    She is currently on Adderall, prescribed by her sleep specialist. An upcoming appointment with her sleep specialist is scheduled at the end of the month.    She recently experienced a manic episode, during which she spent too much money.  She does now feel more in control of her money      Medications    Current Outpatient Medications:     fluticasone (FLONASE) 50 MCG/ACT nasal spray, 2 sprays by Each Nostril route daily as needed for Rhinitis or Allergies, Disp: 1 each, Rfl: 2    amphetamine-dextroamphetamine (ADDERALL) 10 MG tablet, Take 1 tablet by mouth daily., Disp: , Rfl:     ARIPiprazole (ABILIFY) 20 MG tablet, Take 1 tablet by mouth daily, Disp: , Rfl:     gabapentin (NEURONTIN) 300 MG capsule, Take 1 capsule by mouth 3 times daily., Disp: , Rfl:

## 2025-05-28 ENCOUNTER — LAB (OUTPATIENT)
Dept: LAB | Age: 30
End: 2025-05-28

## 2025-05-28 DIAGNOSIS — B78.9 STRONGYLOIDIASIS: ICD-10-CM

## 2025-05-29 ENCOUNTER — OFFICE VISIT (OUTPATIENT)
Dept: INTERNAL MEDICINE CLINIC | Age: 30
End: 2025-05-29
Payer: MEDICAID

## 2025-05-29 VITALS
WEIGHT: 145.4 LBS | BODY MASS INDEX: 26.76 KG/M2 | DIASTOLIC BLOOD PRESSURE: 64 MMHG | SYSTOLIC BLOOD PRESSURE: 106 MMHG | HEIGHT: 62 IN | HEART RATE: 84 BPM | TEMPERATURE: 98 F

## 2025-05-29 DIAGNOSIS — Q79.62 HYPERMOBILE EHLERS-DANLOS SYNDROME: Primary | ICD-10-CM

## 2025-05-29 PROCEDURE — 99212 OFFICE O/P EST SF 10 MIN: CPT

## 2025-05-29 NOTE — PROGRESS NOTES
[email or web address]    Beata for IOS, Android:  [email or web address]         Staffed with: Dr. Martinez.      Yinka Roque MD - IM PGY-1    [facility] INTERNAL MEDICINE  [address] [place] [address]  Dept: [phone]  Dept Fax: [phone]  Loc: [phone]

## 2025-05-30 LAB — O+P STL TRI STN: NORMAL

## 2025-06-28 DIAGNOSIS — J30.9 ALLERGIC RHINITIS WITH POSTNASAL DRIP: ICD-10-CM

## 2025-06-28 DIAGNOSIS — R09.82 ALLERGIC RHINITIS WITH POSTNASAL DRIP: ICD-10-CM

## 2025-06-30 RX ORDER — CETIRIZINE HYDROCHLORIDE 10 MG/1
10 TABLET ORAL DAILY
Qty: 90 TABLET | Refills: 3 | Status: SHIPPED | OUTPATIENT
Start: 2025-06-30 | End: 2025-07-01 | Stop reason: SDUPTHER

## 2025-07-01 ENCOUNTER — PROCEDURE VISIT (OUTPATIENT)
Dept: ALLERGY | Age: 30
End: 2025-07-01
Payer: MEDICAID

## 2025-07-01 VITALS
OXYGEN SATURATION: 97 % | HEART RATE: 79 BPM | DIASTOLIC BLOOD PRESSURE: 72 MMHG | HEIGHT: 62 IN | SYSTOLIC BLOOD PRESSURE: 110 MMHG | WEIGHT: 140 LBS | BODY MASS INDEX: 25.76 KG/M2

## 2025-07-01 DIAGNOSIS — Z91.09 MITE ALLERGY: ICD-10-CM

## 2025-07-01 DIAGNOSIS — Z51.6 ALLERGY DESENSITIZATION THERAPY: ICD-10-CM

## 2025-07-01 DIAGNOSIS — J32.4 CHRONIC PANSINUSITIS: ICD-10-CM

## 2025-07-01 DIAGNOSIS — J30.9 ALLERGIC SINUSITIS: ICD-10-CM

## 2025-07-01 DIAGNOSIS — J30.81 CAT ALLERGIES: ICD-10-CM

## 2025-07-01 DIAGNOSIS — R09.82 ALLERGIC RHINITIS WITH POSTNASAL DRIP: ICD-10-CM

## 2025-07-01 DIAGNOSIS — Z91.048 ALLERGY TO MOLD: ICD-10-CM

## 2025-07-01 DIAGNOSIS — J30.89 NON-SEASONAL ALLERGIC RHINITIS, UNSPECIFIED TRIGGER: Primary | ICD-10-CM

## 2025-07-01 DIAGNOSIS — Z29.89 PROPHYLACTIC IMMUNOTHERAPY: ICD-10-CM

## 2025-07-01 DIAGNOSIS — J30.1 NON-SEASONAL ALLERGIC RHINITIS DUE TO POLLEN: ICD-10-CM

## 2025-07-01 DIAGNOSIS — J30.9 ALLERGIC RHINITIS WITH POSTNASAL DRIP: ICD-10-CM

## 2025-07-01 DIAGNOSIS — Z91.09 POLLEN ALLERGIES: ICD-10-CM

## 2025-07-01 PROCEDURE — 99215 OFFICE O/P EST HI 40 MIN: CPT | Performed by: NURSE PRACTITIONER

## 2025-07-01 PROCEDURE — 95004 PERQ TESTS W/ALRGNC XTRCS: CPT | Performed by: NURSE PRACTITIONER

## 2025-07-01 RX ORDER — EPINEPHRINE 0.3 MG/.3ML
0.3 INJECTION SUBCUTANEOUS PRN
Qty: 1 EACH | Refills: 0 | Status: SHIPPED | OUTPATIENT
Start: 2025-07-01

## 2025-07-01 RX ORDER — CETIRIZINE HYDROCHLORIDE 10 MG/1
10 TABLET ORAL NIGHTLY
Qty: 90 TABLET | Refills: 3 | Status: SHIPPED | OUTPATIENT
Start: 2025-07-01 | End: 2026-06-26

## 2025-07-01 NOTE — PROGRESS NOTES
[facility]    Allergy & Asthma   [address] [place] [address]  Ph:   [phone]  Fax:[phone]     Provider:  Dr. Patricia Alamo    Follow Up         Devora was seen today for procedure.    Diagnoses and all orders for this visit:    Non-seasonal allergic rhinitis, unspecified trigger  -     cetirizine (ZYRTEC) 10 MG tablet; Take 1 tablet by mouth nightly  -     EPINEPHrine (EPIPEN 2-UBALDO) 0.3 MG/0.3ML SOAJ injection; Inject 0.3 mLs into the muscle as needed (anaphylaxis) Use as directed for allergic reaction    Allergy to mold  -     cetirizine (ZYRTEC) 10 MG tablet; Take 1 tablet by mouth nightly  -     EPINEPHrine (EPIPEN 2-UBALDO) 0.3 MG/0.3ML SOAJ injection; Inject 0.3 mLs into the muscle as needed (anaphylaxis) Use as directed for allergic reaction    Pollen allergies  -     cetirizine (ZYRTEC) 10 MG tablet; Take 1 tablet by mouth nightly  -     EPINEPHrine (EPIPEN 2-UBALDO) 0.3 MG/0.3ML SOAJ injection; Inject 0.3 mLs into the muscle as needed (anaphylaxis) Use as directed for allergic reaction    Non-seasonal allergic rhinitis due to pollen  -     cetirizine (ZYRTEC) 10 MG tablet; Take 1 tablet by mouth nightly  -     EPINEPHrine (EPIPEN 2-UBALDO) 0.3 MG/0.3ML SOAJ injection; Inject 0.3 mLs into the muscle as needed (anaphylaxis) Use as directed for allergic reaction    Chronic pansinusitis  -     cetirizine (ZYRTEC) 10 MG tablet; Take 1 tablet by mouth nightly  -     EPINEPHrine (EPIPEN 2-UBALDO) 0.3 MG/0.3ML SOAJ injection; Inject 0.3 mLs into the muscle as needed (anaphylaxis) Use as directed for allergic reaction    Allergic sinusitis  -     cetirizine (ZYRTEC) 10 MG tablet; Take 1 tablet by mouth nightly  -     EPINEPHrine (EPIPEN 2-UBALDO) 0.3 MG/0.3ML SOAJ injection; Inject 0.3 mLs into the muscle as needed (anaphylaxis) Use as directed for allergic reaction    Cat allergies  -     cetirizine (ZYRTEC) 10 MG tablet; Take 1 tablet by mouth nightly  -     EPINEPHrine (EPIPEN 2-UBALDO) 0.3 MG/0.3ML SOAJ

## 2025-07-01 NOTE — PATIENT INSTRUCTIONS
of bumps bigger than a dime in size or an uncomfortable amount of itching are required to be reported to the injection personnel before you leave the office. If the reaction gets larger or itchier after leaving the office, please report the final size to the injection personnel before your next  injection.    Systemic Reaction  This reaction is life threatening. Almost all systemic reactions occur within in the first 30-minutes of receiving an allergy injection, hence the reason for the mandatory 30-minute wait in our office or any medical facility after receiving an allergy  injection.    A systemic reaction may occur at any time during your allergy treatment. Even with being on your maintenance dose for several years, it is still possible (and does occur) for a systemic reaction to occur. A systemic reaction may have any or all of the following:   Sneezing   Runny and/or itchy nose   Nasal congestion   Itchy and/or watery eyes   Itchy ears or palate (roof of the mouth)   Coughing   Shortness of breath or wheezing   Fainting   Dizziness   Itchy throat   Throat tightness   Hives or itchy skin   Flushing of the skin    If these symptoms develop in our office, please tell the injection personnel or nearest employee immediately. If these symptoms develop outside our office, you will require treatment at the nearest emergency facility immediately. USE YOUR EPIPEN OR AUVI-Q IF NECESSARY.  A systemic reaction will necessitate a reduction of one full dilution in your allergy injections.    Delayed Reactions  Not all reactions occur within the 30-minute waiting period. You may have a delayed reaction (local or systemic) where symptoms can develop up to 24 hours later. These reactions must be reported to us before your next injection. They are as important as reactions that occur in the office. Please make it a habit to recheck your injection site throughout the day and report any bumps that develop. (See Local and Systemic

## 2025-07-08 ENCOUNTER — OFFICE VISIT (OUTPATIENT)
Dept: FAMILY MEDICINE CLINIC | Age: 30
End: 2025-07-08
Payer: MEDICAID

## 2025-07-08 ENCOUNTER — APPOINTMENT (OUTPATIENT)
Dept: PHYSICAL THERAPY | Age: 30
End: 2025-07-08
Payer: MEDICAID

## 2025-07-08 VITALS
OXYGEN SATURATION: 99 % | DIASTOLIC BLOOD PRESSURE: 74 MMHG | WEIGHT: 138 LBS | HEART RATE: 77 BPM | RESPIRATION RATE: 18 BRPM | HEIGHT: 62 IN | BODY MASS INDEX: 25.4 KG/M2 | SYSTOLIC BLOOD PRESSURE: 118 MMHG

## 2025-07-08 DIAGNOSIS — F90.0 ATTENTION DEFICIT HYPERACTIVITY DISORDER (ADHD), PREDOMINANTLY INATTENTIVE TYPE: ICD-10-CM

## 2025-07-08 DIAGNOSIS — J30.89 NON-SEASONAL ALLERGIC RHINITIS, UNSPECIFIED TRIGGER: ICD-10-CM

## 2025-07-08 DIAGNOSIS — S05.01XA ABRASION OF RIGHT CORNEA, INITIAL ENCOUNTER: Primary | ICD-10-CM

## 2025-07-08 PROCEDURE — 99214 OFFICE O/P EST MOD 30 MIN: CPT | Performed by: NURSE PRACTITIONER

## 2025-07-08 RX ORDER — GENTAMICIN SULFATE 3 MG/ML
1 SOLUTION/ DROPS OPHTHALMIC 3 TIMES DAILY
Qty: 1 EACH | Refills: 0 | Status: SHIPPED | OUTPATIENT
Start: 2025-07-08 | End: 2025-07-13

## 2025-07-08 RX ORDER — FLUTICASONE PROPIONATE 50 MCG
2 SPRAY, SUSPENSION (ML) NASAL DAILY PRN
Qty: 1 EACH | Refills: 2 | Status: SHIPPED | OUTPATIENT
Start: 2025-07-08

## 2025-07-08 ASSESSMENT — ENCOUNTER SYMPTOMS
EYE REDNESS: 1
RHINORRHEA: 0
EYE PAIN: 0
COUGH: 0
TROUBLE SWALLOWING: 0
VOMITING: 0
WHEEZING: 0
NAUSEA: 0
BACK PAIN: 0
CONSTIPATION: 0
SORE THROAT: 0
EYE DISCHARGE: 0
SHORTNESS OF BREATH: 0
DIARRHEA: 0
ALLERGIC/IMMUNOLOGIC NEGATIVE: 1
ABDOMINAL PAIN: 0

## 2025-07-08 NOTE — PROGRESS NOTES
[facility]  [address] [id number]  Dept: [phone]  Dept Fax: [phone]  Loc: [phone]     Visit Date:  7/8/2025      Patient:  Devora Davis  YOB: 1995    HPI:     Chief Complaint   Patient presents with    Eye Pain     Patient has contacts and is not sure if she scratched her right eye. [place] is painful and red. Light sensitivity. Sx for 2 days.        Patient here for regular 3-month periodic follow-up on ADD/ADHD.  States the medication is having the intended effect with increased concentration, decreased anxiety and concern, with no indication of side effects such as tachycardia, shortness of breath, chest pain, insomnia, or addiction.         History of Present Illness  The patient is a 30-year-old female who presents for evaluation of ADHD, eye pain, and hypermobility.    She reports that her medication for ADHD has been effective. She has been adhering to the prescribed regimen of XR 15 mg daily but has not been taking the 10 mg dose regularly. She is not currently taking Prozac.    A few days ago, she started using Acuvue Oasis contact lenses. She wore them overnight initially but experienced a sensation akin to a scratch in her eye the following night. She did not inspect her eye in the mirror due to bedtime preparations. She used the provided solution to clean her hands instead of washing them. She is uncertain if she has conjunctivitis. She removed both contacts and currently experiences discomfort in bright light. Her eye was notably red when she woke up the next day. She has refrained from wearing her contacts until she receives a diagnosis. She has an upcoming appointment with her ophthalmologist on Friday to discuss her experience with the brand.    She consulted with Dr. Roque regarding her hypermobility after her last visit here. The discussion was somewhat perplexing, but she recalls that he

## 2025-07-16 ENCOUNTER — TELEPHONE (OUTPATIENT)
Dept: INTERNAL MEDICINE CLINIC | Age: 30
End: 2025-07-16

## 2025-07-16 NOTE — TELEPHONE ENCOUNTER
Patient thought she had an appointment today, there was nothing in the schedule. After reviewing her chart I cannot find a follow up appointment at all. Do you want this patient back in for follow up or PRN?

## 2025-07-22 ENCOUNTER — APPOINTMENT (OUTPATIENT)
Dept: PHYSICAL THERAPY | Age: 30
End: 2025-07-22
Payer: MEDICAID

## 2025-07-22 ENCOUNTER — HOSPITAL ENCOUNTER (OUTPATIENT)
Dept: PHYSICAL THERAPY | Age: 30
Setting detail: THERAPIES SERIES
Discharge: HOME | End: 2025-07-22
Payer: MEDICAID

## 2025-07-22 DIAGNOSIS — J30.9 CHRONIC ALLERGIC RHINITIS: Primary | ICD-10-CM

## 2025-07-22 DIAGNOSIS — Z29.89 IMMUNOTHERAPY: ICD-10-CM

## 2025-07-22 PROCEDURE — 97530 THERAPEUTIC ACTIVITIES: CPT

## 2025-07-22 PROCEDURE — 97163 PT EVAL HIGH COMPLEX 45 MIN: CPT

## 2025-07-22 PROCEDURE — 95165 ANTIGEN THERAPY SERVICES: CPT | Performed by: NURSE PRACTITIONER

## 2025-07-22 PROCEDURE — 97110 THERAPEUTIC EXERCISES: CPT

## 2025-07-22 PROCEDURE — 97112 NEUROMUSCULAR REEDUCATION: CPT

## 2025-07-22 NOTE — PROGRESS NOTES
TYPE OF INSURANCE: MEDICAID  PT HAS AGREED TO START ALLERGY INJECTIONS. ALLERGY SERUMS MIXED  ALLERGY EXTRACT MIXING.    DATE OF MIXING= Today   TOTAL NUMBER OF SET OF VIALS= 3  TOTAL VIALS PREPARED = 15  TOTAL INJECTABLE DOSES =   50 INJECTIONS PER SET  TOTAL ANTICIPATED DOSES = 150 INJECTIONS   TOTAL NUMBER OF INJECTIONS BILLED TODAY = 30       An allergy extract was prepared according to written prescription by provider.  Provider onsite during allergy extract preparation. Patient vial(s) include the following:     RED VIAL - 1:1 CONCENTRATION - EXPIRES 12 MONTHS  YELLOW VIAL-1:10 CONCENTRATION - EXPIRES 12 MONTHS  BLUE VIAL-1:100 CONCENTRATION - EXPIRES 12 MONTHS  GREEN VIAL-1:1,000 CONCENTRATION - EXPIRES 6 MONTHS  SILVER VIAL-1:10,000 CONCENTRATION - EXPIRES 6 MONTHS    Allergy extract was prepared by the following method:   RED TO YELLOW:  Once the  one-to-one concentration was prepared in the red vial, 0.5 ML's was then extracted in place into the yellow vial to achieve a 1:10 concentration.    YELLOW TO BLUE:  Then 0.5 ML's was extracted from the yellow vial and placed into the blue file with dilutant to achieve a 1:100 concentration.    BLUE TO GREEN:  Then 0.5 ML's was extracted from the blue vial and placed into Green vial with dilute to achieve a 1:1,000 concentration.    GREEN TO SILVER:  Then 0.5 ML's was taken from the green vial and placed in the Silver vial with dilutant to achieve a 1:10,000 concentration.      Patient vials were labeled with name, date-of-birth, vial (A,B,or C), concentration, and expiration.    When injecting from a new  vial the first injections is 0.05 ml and are increased by 0.05 increments until 0.5ml from the vial is achieved or the patient reaches maximum tolerated dose.   Injections are given once ot three times weekly and at least 24 hours apart, according to provider orders.   If patient has complications or \"knots\" or maximum tolerance the dose building is reduced,

## 2025-07-22 NOTE — PROGRESS NOTES
[facility]  PHYSICAL THERAPY  OUTPATIENT REHABILITATION - SPECIALIZED THERAPY SERVICES  [x] PELVIC HEALTH EVALUATION  [] DAILY NOTE  [] PROGRESS NOTE [] DISCHARGE NOTE    Date: 2025  Patient Name:  Devora Davis  : 1995  MRN: [id number]  CSN: [id number]    Referring Practitioner Ladarius Clark APRN - CNP [phone]      Diagnosis  Diagnoses       M62.89 (ICD-10-CM) - Pelvic floor dysfunction in female           Treatment Diagnosis M62.89 - Other Specified Disorders of Muscle  N39.3 - Stress Incontinence female  K59.09 - Other Constipation   Date of Evaluation 25   Additional Pertinent History Devora Davis has a past medical history of ADHD (attention deficit hyperactivity disorder).psychiatric history   she has a past surgical history that includes fracture surgery (Bilateral) and Dilation and curettage of uterus (2019).     Allergies Allergies   Allergen Reactions    Amoxicillin     Benadryl [Diphenhydramine] Headaches      Medications   Current Outpatient Medications:     fluticasone (FLONASE) 50 MCG/ACT nasal spray, 2 sprays by Each Nostril route daily as needed for Rhinitis or Allergies, Disp: 1 each, Rfl: 2    cetirizine (ZYRTEC) 10 MG tablet, Take 1 tablet by mouth nightly, Disp: 90 tablet, Rfl: 3    EPINEPHrine (EPIPEN 2-UBALDO) 0.3 MG/0.3ML SOAJ injection, Inject 0.3 mLs into the muscle as needed (anaphylaxis) Use as directed for allergic reaction, Disp: 1 each, Rfl: 0    amphetamine-dextroamphetamine (ADDERALL) 10 MG tablet, Take 1 tablet by mouth daily., Disp: , Rfl:     gabapentin (NEURONTIN) 300 MG capsule, Take 1 capsule by mouth 3 times daily., Disp: , Rfl:     amphetamine-dextroamphetamine (ADDERALL XR) 15 MG extended release capsule, Take 1 capsule by mouth every morning., Disp: , Rfl:     PARAGARD INTRAUTERINE COPPER IU, Paragard Intrauterine Copper, Disp: , Rfl:       Functional Outcome Measure Used IIQ and ITALO   Functional Outcome Score 6 and 15

## 2025-08-04 ENCOUNTER — LAB (OUTPATIENT)
Dept: LAB | Age: 30
End: 2025-08-04

## 2025-08-04 ENCOUNTER — OFFICE VISIT (OUTPATIENT)
Dept: FAMILY MEDICINE CLINIC | Age: 30
End: 2025-08-04

## 2025-08-04 VITALS
RESPIRATION RATE: 16 BRPM | OXYGEN SATURATION: 98 % | WEIGHT: 135.5 LBS | DIASTOLIC BLOOD PRESSURE: 74 MMHG | HEART RATE: 101 BPM | TEMPERATURE: 97.4 F | BODY MASS INDEX: 24.78 KG/M2 | SYSTOLIC BLOOD PRESSURE: 110 MMHG

## 2025-08-04 DIAGNOSIS — F42.2 MIXED OBSESSIONAL THOUGHTS AND ACTS: ICD-10-CM

## 2025-08-04 DIAGNOSIS — J30.89 NON-SEASONAL ALLERGIC RHINITIS, UNSPECIFIED TRIGGER: ICD-10-CM

## 2025-08-04 DIAGNOSIS — G47.411 CATAPLEXY: ICD-10-CM

## 2025-08-04 DIAGNOSIS — F42.2 MIXED OBSESSIONAL THOUGHTS AND ACTS: Primary | ICD-10-CM

## 2025-08-04 PROBLEM — F25.8 OTHER SCHIZOAFFECTIVE DISORDERS (HCC): Status: RESOLVED | Noted: 2024-12-30 | Resolved: 2025-08-04

## 2025-08-04 RX ORDER — FLUTICASONE PROPIONATE 50 MCG
2 SPRAY, SUSPENSION (ML) NASAL DAILY PRN
Qty: 1 EACH | Refills: 2 | Status: SHIPPED | OUTPATIENT
Start: 2025-08-04

## 2025-08-04 ASSESSMENT — ENCOUNTER SYMPTOMS
EYE REDNESS: 0
BACK PAIN: 0
COUGH: 0
SORE THROAT: 0
VOMITING: 0
EYE DISCHARGE: 0
RHINORRHEA: 0
NAUSEA: 0
TROUBLE SWALLOWING: 0
DIARRHEA: 0
WHEEZING: 0
EYE PAIN: 0
SHORTNESS OF BREATH: 0
CONSTIPATION: 0
ABDOMINAL PAIN: 0
ALLERGIC/IMMUNOLOGIC NEGATIVE: 1

## 2025-08-07 LAB — HEAVY METAL: NORMAL

## 2025-08-13 ENCOUNTER — HOSPITAL ENCOUNTER (OUTPATIENT)
Dept: PHYSICAL THERAPY | Age: 30
Setting detail: THERAPIES SERIES
Discharge: HOME | End: 2025-08-13
Payer: MEDICAID

## 2025-08-13 ENCOUNTER — LAB (OUTPATIENT)
Dept: LAB | Age: 30
End: 2025-08-13

## 2025-08-13 PROCEDURE — 97530 THERAPEUTIC ACTIVITIES: CPT

## 2025-08-13 PROCEDURE — 97110 THERAPEUTIC EXERCISES: CPT

## 2025-08-18 ENCOUNTER — CLINICAL SUPPORT (OUTPATIENT)
Dept: ALLERGY | Age: 30
End: 2025-08-18
Payer: MEDICAID

## 2025-08-18 VITALS
OXYGEN SATURATION: 98 % | DIASTOLIC BLOOD PRESSURE: 79 MMHG | RESPIRATION RATE: 18 BRPM | SYSTOLIC BLOOD PRESSURE: 120 MMHG | HEART RATE: 90 BPM

## 2025-08-18 DIAGNOSIS — J30.9 ALLERGIC SINUSITIS: ICD-10-CM

## 2025-08-18 DIAGNOSIS — J30.1 NON-SEASONAL ALLERGIC RHINITIS DUE TO POLLEN: Primary | ICD-10-CM

## 2025-08-18 PROCEDURE — 95117 IMMUNOTHERAPY INJECTIONS: CPT | Performed by: NURSE PRACTITIONER

## 2025-08-19 LAB — HEAVY METAL: NORMAL

## 2025-08-25 ENCOUNTER — APPOINTMENT (OUTPATIENT)
Dept: PHYSICAL THERAPY | Age: 30
End: 2025-08-25
Payer: MEDICAID

## 2025-08-26 ENCOUNTER — TELEPHONE (OUTPATIENT)
Dept: ALLERGY | Age: 30
End: 2025-08-26

## 2025-08-26 ENCOUNTER — APPOINTMENT (OUTPATIENT)
Dept: PHYSICAL THERAPY | Age: 30
End: 2025-08-26
Payer: MEDICAID

## 2025-08-28 ENCOUNTER — CLINICAL SUPPORT (OUTPATIENT)
Dept: ALLERGY | Age: 30
End: 2025-08-28
Payer: MEDICAID

## 2025-08-28 VITALS
OXYGEN SATURATION: 98 % | HEART RATE: 105 BPM | DIASTOLIC BLOOD PRESSURE: 80 MMHG | SYSTOLIC BLOOD PRESSURE: 124 MMHG | RESPIRATION RATE: 18 BRPM

## 2025-08-28 DIAGNOSIS — J30.9 ALLERGIC SINUSITIS: ICD-10-CM

## 2025-08-28 DIAGNOSIS — J30.1 NON-SEASONAL ALLERGIC RHINITIS DUE TO POLLEN: Primary | ICD-10-CM

## 2025-08-28 PROCEDURE — 95117 IMMUNOTHERAPY INJECTIONS: CPT | Performed by: NURSE PRACTITIONER

## 2025-09-04 ENCOUNTER — CLINICAL SUPPORT (OUTPATIENT)
Dept: ALLERGY | Age: 30
End: 2025-09-04
Payer: MEDICAID

## 2025-09-04 VITALS — OXYGEN SATURATION: 99 % | HEART RATE: 95 BPM | SYSTOLIC BLOOD PRESSURE: 135 MMHG | DIASTOLIC BLOOD PRESSURE: 90 MMHG

## 2025-09-04 DIAGNOSIS — J30.1 NON-SEASONAL ALLERGIC RHINITIS DUE TO POLLEN: Primary | ICD-10-CM

## 2025-09-04 DIAGNOSIS — J30.9 ALLERGIC SINUSITIS: ICD-10-CM

## 2025-09-04 PROCEDURE — 95117 IMMUNOTHERAPY INJECTIONS: CPT | Performed by: NURSE PRACTITIONER
